# Patient Record
Sex: FEMALE | Race: WHITE | URBAN - METROPOLITAN AREA
[De-identification: names, ages, dates, MRNs, and addresses within clinical notes are randomized per-mention and may not be internally consistent; named-entity substitution may affect disease eponyms.]

---

## 2017-01-06 ENCOUNTER — TRANSFERRED RECORDS (OUTPATIENT)
Dept: HEALTH INFORMATION MANAGEMENT | Facility: CLINIC | Age: 27
End: 2017-01-06

## 2017-01-10 ENCOUNTER — PRENATAL OFFICE VISIT (OUTPATIENT)
Dept: OBGYN | Facility: CLINIC | Age: 27
End: 2017-01-10
Payer: MEDICAID

## 2017-01-10 VITALS
DIASTOLIC BLOOD PRESSURE: 78 MMHG | HEART RATE: 88 BPM | OXYGEN SATURATION: 97 % | TEMPERATURE: 97.9 F | BODY MASS INDEX: 22.37 KG/M2 | SYSTOLIC BLOOD PRESSURE: 115 MMHG | WEIGHT: 147.1 LBS

## 2017-01-10 DIAGNOSIS — G40.019 LOCALIZATION-RELATED (FOCAL) (PARTIAL) IDIOPATHIC EPILEPSY AND EPILEPTIC SYNDROMES WITH SEIZURES OF LOCALIZED ONSET, INTRACTABLE, WITHOUT STATUS EPILEPTICUS (H): Primary | ICD-10-CM

## 2017-01-10 DIAGNOSIS — Z34.00 ENCOUNTER FOR SUPERVISION OF NORMAL FIRST PREGNANCY, UNSPECIFIED TRIMESTER: ICD-10-CM

## 2017-01-10 LAB
GLUCOSE 1H P 50 G GLC PO SERPL-MCNC: 88 MG/DL (ref 60–129)
HGB BLD-MCNC: 10.9 G/DL (ref 11.7–15.7)

## 2017-01-10 PROCEDURE — 82950 GLUCOSE TEST: CPT | Performed by: OBSTETRICS & GYNECOLOGY

## 2017-01-10 PROCEDURE — 99212 OFFICE O/P EST SF 10 MIN: CPT | Performed by: OBSTETRICS & GYNECOLOGY

## 2017-01-10 PROCEDURE — 80339 ANTIEPILEPTICS NOS 1-3: CPT | Mod: 90 | Performed by: OBSTETRICS & GYNECOLOGY

## 2017-01-10 PROCEDURE — 80201 ASSAY OF TOPIRAMATE: CPT | Mod: 90 | Performed by: OBSTETRICS & GYNECOLOGY

## 2017-01-10 PROCEDURE — 99000 SPECIMEN HANDLING OFFICE-LAB: CPT | Performed by: OBSTETRICS & GYNECOLOGY

## 2017-01-10 PROCEDURE — 80175 DRUG SCREEN QUAN LAMOTRIGINE: CPT | Mod: 90 | Performed by: OBSTETRICS & GYNECOLOGY

## 2017-01-10 PROCEDURE — 00000218 ZZHCL STATISTIC OBHBG - HEMOGLOBIN: Performed by: OBSTETRICS & GYNECOLOGY

## 2017-01-10 PROCEDURE — 80299 QUANTITATIVE ASSAY DRUG: CPT | Mod: 90 | Performed by: OBSTETRICS & GYNECOLOGY

## 2017-01-10 PROCEDURE — 80177 DRUG SCRN QUAN LEVETIRACETAM: CPT | Mod: 90 | Performed by: OBSTETRICS & GYNECOLOGY

## 2017-01-10 PROCEDURE — 36415 COLL VENOUS BLD VENIPUNCTURE: CPT | Performed by: OBSTETRICS & GYNECOLOGY

## 2017-01-10 ASSESSMENT — ANXIETY QUESTIONNAIRES
6. BECOMING EASILY ANNOYED OR IRRITABLE: SEVERAL DAYS
7. FEELING AFRAID AS IF SOMETHING AWFUL MIGHT HAPPEN: NOT AT ALL
GAD7 TOTAL SCORE: 4
5. BEING SO RESTLESS THAT IT IS HARD TO SIT STILL: NOT AT ALL
1. FEELING NERVOUS, ANXIOUS, OR ON EDGE: SEVERAL DAYS
3. WORRYING TOO MUCH ABOUT DIFFERENT THINGS: NOT AT ALL
2. NOT BEING ABLE TO STOP OR CONTROL WORRYING: SEVERAL DAYS

## 2017-01-10 ASSESSMENT — PATIENT HEALTH QUESTIONNAIRE - PHQ9: 5. POOR APPETITE OR OVEREATING: SEVERAL DAYS

## 2017-01-10 NOTE — PROGRESS NOTES
Was in [facility] [place] for more seziures and had to resign job.  Has all new medications now to try and control seizures, but had one this morning.  Has growth u/s scheduled 1/23.  Mood good with PHQ-9 and ROSY 7 both <5 today--stable on zoloft.  Has baby showers coming up.  GCT and here with mother today. RTC 2-4 weeks. BE    Childbirth classes? Probably not  Plan on breastfeeding? Yes  Birthcontrol? oral contraceptive  Gender on ultrasound? boy  Circumsion? Yes--discussed outpatient  Peds doc? [facility] in [facility]

## 2017-01-11 ASSESSMENT — ANXIETY QUESTIONNAIRES: GAD7 TOTAL SCORE: 4

## 2017-01-11 ASSESSMENT — PATIENT HEALTH QUESTIONNAIRE - PHQ9: SUM OF ALL RESPONSES TO PHQ QUESTIONS 1-9: 3

## 2017-01-16 LAB
CLOBAZAM SERPL-MCNC: 345 UG/ML
DESMETHYLCLOBAZAM: 1157
DESMETHYLLACOSAMIDE: 2.9
LACOSAMIDE BLD-MCNC: 10 UG/ML
LAMOTRIGINE SERPL-MCNC: ABNORMAL UG/ML
LEVETIRACETAM SERPL-MCNC: 95.6 UG/ML
TOPIRAMATE SERPL-MCNC: 6.2 UG/ML

## 2017-01-18 ENCOUNTER — TRANSFERRED RECORDS (OUTPATIENT)
Dept: HEALTH INFORMATION MANAGEMENT | Facility: CLINIC | Age: 27
End: 2017-01-18

## 2017-01-23 ENCOUNTER — OFFICE VISIT (OUTPATIENT)
Dept: MATERNAL FETAL MEDICINE | Facility: CLINIC | Age: 27
End: 2017-01-23
Attending: OBSTETRICS & GYNECOLOGY
Payer: MEDICAID

## 2017-01-23 ENCOUNTER — HOSPITAL ENCOUNTER (OUTPATIENT)
Dept: ULTRASOUND IMAGING | Facility: CLINIC | Age: 27
Discharge: HOME | End: 2017-01-23
Attending: OBSTETRICS & GYNECOLOGY | Admitting: OBSTETRICS & GYNECOLOGY
Payer: MEDICAID

## 2017-01-23 DIAGNOSIS — G40.909 EPILEPSY AFFECTING PREGNANCY, ANTEPARTUM (H): ICD-10-CM

## 2017-01-23 DIAGNOSIS — O99.350 EPILEPSY AFFECTING PREGNANCY, ANTEPARTUM (H): ICD-10-CM

## 2017-01-23 DIAGNOSIS — O99.353 SEIZURE DISORDER DURING PREGNANCY IN THIRD TRIMESTER (H): Primary | ICD-10-CM

## 2017-01-23 DIAGNOSIS — G40.909 SEIZURE DISORDER DURING PREGNANCY IN THIRD TRIMESTER (H): Primary | ICD-10-CM

## 2017-01-23 PROCEDURE — 76816 OB US FOLLOW-UP PER FETUS: CPT

## 2017-01-23 NOTE — PROGRESS NOTES
"Please see \"Imaging\" tab under \"Chart Review\" for details of today's US.    Gisele Tovar, DO    "

## 2017-02-03 ENCOUNTER — MEDICAL CORRESPONDENCE (OUTPATIENT)
Dept: HEALTH INFORMATION MANAGEMENT | Facility: CLINIC | Age: 27
End: 2017-02-03

## 2017-02-03 ENCOUNTER — TRANSFERRED RECORDS (OUTPATIENT)
Dept: HEALTH INFORMATION MANAGEMENT | Facility: CLINIC | Age: 27
End: 2017-02-03

## 2017-02-09 ENCOUNTER — PRENATAL OFFICE VISIT (OUTPATIENT)
Dept: OBGYN | Facility: CLINIC | Age: 27
End: 2017-02-09
Payer: MEDICAID

## 2017-02-09 VITALS
HEART RATE: 87 BPM | WEIGHT: 152 LBS | DIASTOLIC BLOOD PRESSURE: 66 MMHG | BODY MASS INDEX: 23.12 KG/M2 | OXYGEN SATURATION: 99 % | SYSTOLIC BLOOD PRESSURE: 107 MMHG

## 2017-02-09 DIAGNOSIS — Z34.00 ENCOUNTER FOR SUPERVISION OF NORMAL FIRST PREGNANCY, UNSPECIFIED TRIMESTER: Primary | ICD-10-CM

## 2017-02-09 DIAGNOSIS — G40.019 LOCALIZATION-RELATED IDIOPATHIC EPILEPSY AND EPILEPTIC SYNDROMES WITH SEIZURES OF LOCALIZED ONSET, INTRACTABLE, WITHOUT STATUS EPILEPTICUS (H): Primary | ICD-10-CM

## 2017-02-09 PROCEDURE — 99212 OFFICE O/P EST SF 10 MIN: CPT | Performed by: OBSTETRICS & GYNECOLOGY

## 2017-02-09 PROCEDURE — 80339 ANTIEPILEPTICS NOS 1-3: CPT | Mod: 90 | Performed by: OBSTETRICS & GYNECOLOGY

## 2017-02-09 PROCEDURE — 36415 COLL VENOUS BLD VENIPUNCTURE: CPT | Performed by: OBSTETRICS & GYNECOLOGY

## 2017-02-09 PROCEDURE — 99000 SPECIMEN HANDLING OFFICE-LAB: CPT | Performed by: OBSTETRICS & GYNECOLOGY

## 2017-02-09 PROCEDURE — 80201 ASSAY OF TOPIRAMATE: CPT | Mod: 90 | Performed by: OBSTETRICS & GYNECOLOGY

## 2017-02-09 PROCEDURE — 80177 DRUG SCRN QUAN LEVETIRACETAM: CPT | Mod: 90 | Performed by: OBSTETRICS & GYNECOLOGY

## 2017-02-09 PROCEDURE — 80299 QUANTITATIVE ASSAY DRUG: CPT | Mod: 90 | Performed by: OBSTETRICS & GYNECOLOGY

## 2017-02-09 NOTE — PROGRESS NOTES
Seeing neuro every 2 weeks and has been changing meds and doses at least every 1-2 weeks. Has new med list with her today. Found out she will not be able to breastfeed--sad about that and so formula discussed. Has not had a seizure in 7 or 8 days now, but has bad auras. With insurance had to pick either me or neuro to be on insurance since [facility] vs [facility] and she chose neuro. May need to transfer to [facility] for delivery and working on that now with insurance, hopes can stay with us until May 1st. Will let me know.  Neuro discussed maybe doing IOL so can deliver during the day and not be over tired as more likely to have sz then--discussed will depend on her cervical exam at term.   Is going to move in with parents as has to move out of apartment now. Last growth 68%, has repeat growth scheduled. Has had some contractions, mostly at night. Discussed contractions vs uterine irritability. Sounds more like irritable uterus. Drinking lots of liquids. Check labs today for neuro. Has next growth u/s 2/21. RTC 2 weeks. BE

## 2017-02-11 ENCOUNTER — HOSPITAL ENCOUNTER (OUTPATIENT)
Facility: CLINIC | Age: 27
Setting detail: OBSERVATION
Discharge: HOME | End: 2017-02-13
Attending: OBSTETRICS & GYNECOLOGY | Admitting: OBSTETRICS & GYNECOLOGY
Payer: MEDICAID

## 2017-02-11 ENCOUNTER — MYC MEDICAL ADVICE (OUTPATIENT)
Dept: OBGYN | Facility: CLINIC | Age: 27
End: 2017-02-11

## 2017-02-11 DIAGNOSIS — Z33.1 PREGNANCY, INCIDENTAL: Primary | ICD-10-CM

## 2017-02-11 PROBLEM — Z36.89 ENCOUNTER FOR TRIAGE IN PREGNANT PATIENT: Status: ACTIVE | Noted: 2017-02-11

## 2017-02-11 LAB
ALBUMIN UR-MCNC: NEGATIVE MG/DL
APPEARANCE UR: CLEAR
BACTERIA #/AREA URNS HPF: ABNORMAL /HPF
BILIRUB UR QL STRIP: NEGATIVE
COLOR UR AUTO: ABNORMAL
DESMETHYLLACOSAMIDE: 2.9
GLUCOSE UR STRIP-MCNC: NEGATIVE MG/DL
HGB UR QL STRIP: ABNORMAL
KETONES UR STRIP-MCNC: NEGATIVE MG/DL
LACOSAMIDE BLD-MCNC: 13.2 UG/ML
LEUKOCYTE ESTERASE UR QL STRIP: NEGATIVE
LEVETIRACETAM SERPL-MCNC: 90.7 UG/ML
MICRO REPORT STATUS: ABNORMAL
NITRATE UR QL: NEGATIVE
PH UR STRIP: 7 PH (ref 5–7)
RBC #/AREA URNS AUTO: 13 /HPF (ref 0–2)
SP GR UR STRIP: 1 (ref 1–1.03)
SPECIMEN SOURCE: ABNORMAL
SQUAMOUS #/AREA URNS AUTO: <1 /HPF (ref 0–1)
TOPIRAMATE SERPL-MCNC: 3.7 UG/ML
URN SPEC COLLECT METH UR: ABNORMAL
UROBILINOGEN UR STRIP-MCNC: NORMAL MG/DL (ref 0–2)
WBC #/AREA URNS AUTO: 1 /HPF (ref 0–2)
WET PREP SPEC: ABNORMAL

## 2017-02-11 PROCEDURE — 99214 OFFICE O/P EST MOD 30 MIN: CPT | Mod: 25

## 2017-02-11 PROCEDURE — 81001 URINALYSIS AUTO W/SCOPE: CPT | Performed by: OBSTETRICS & GYNECOLOGY

## 2017-02-11 PROCEDURE — 25900015 H RX 259: Performed by: OBSTETRICS & GYNECOLOGY

## 2017-02-11 PROCEDURE — 87210 SMEAR WET MOUNT SALINE/INK: CPT | Performed by: OBSTETRICS & GYNECOLOGY

## 2017-02-11 PROCEDURE — 59025 FETAL NON-STRESS TEST: CPT | Mod: 26 | Performed by: OBSTETRICS & GYNECOLOGY

## 2017-02-11 PROCEDURE — 87491 CHLMYD TRACH DNA AMP PROBE: CPT | Performed by: OBSTETRICS & GYNECOLOGY

## 2017-02-11 PROCEDURE — 59025 FETAL NON-STRESS TEST: CPT

## 2017-02-11 PROCEDURE — 96360 HYDRATION IV INFUSION INIT: CPT

## 2017-02-11 PROCEDURE — 87591 N.GONORRHOEAE DNA AMP PROB: CPT | Performed by: OBSTETRICS & GYNECOLOGY

## 2017-02-11 PROCEDURE — 25800025 ZZH RX 258

## 2017-02-11 PROCEDURE — 25000132 ZZH RX MED GY IP 250 OP 250 PS 637: Performed by: OBSTETRICS & GYNECOLOGY

## 2017-02-11 PROCEDURE — 99214 OFFICE O/P EST MOD 30 MIN: CPT | Mod: 25 | Performed by: OBSTETRICS & GYNECOLOGY

## 2017-02-11 RX ORDER — CLOBAZAM 10 MG/1
20 TABLET ORAL DAILY
Status: DISCONTINUED | COMMUNITY
Start: 2017-02-12 | End: 2017-02-13 | Stop reason: HOSPADM

## 2017-02-11 RX ORDER — SODIUM CHLORIDE, SODIUM LACTATE, POTASSIUM CHLORIDE, CALCIUM CHLORIDE 600; 310; 30; 20 MG/100ML; MG/100ML; MG/100ML; MG/100ML
INJECTION, SOLUTION INTRAVENOUS
Status: COMPLETED
Start: 2017-02-11 | End: 2017-02-11

## 2017-02-11 RX ORDER — NIFEDIPINE 20 MG/1
20 CAPSULE ORAL ONCE
Status: COMPLETED | OUTPATIENT
Start: 2017-02-11 | End: 2017-02-11

## 2017-02-11 RX ORDER — LACOSAMIDE 50 MG/1
100 TABLET ORAL DAILY
Status: DISCONTINUED | OUTPATIENT
Start: 2017-02-12 | End: 2017-02-13 | Stop reason: HOSPADM

## 2017-02-11 RX ORDER — LACOSAMIDE 200 MG/1
400 TABLET ORAL DAILY
Status: DISCONTINUED | OUTPATIENT
Start: 2017-02-12 | End: 2017-02-13 | Stop reason: HOSPADM

## 2017-02-11 RX ORDER — TOPIRAMATE 25 MG/1
25 TABLET, FILM COATED ORAL 4 TIMES DAILY
Status: DISCONTINUED | OUTPATIENT
Start: 2017-02-12 | End: 2017-02-13 | Stop reason: HOSPADM

## 2017-02-11 RX ORDER — NIFEDIPINE 20 MG/1
20 CAPSULE ORAL EVERY 30 MIN PRN
Status: COMPLETED | OUTPATIENT
Start: 2017-02-11 | End: 2017-02-11

## 2017-02-11 RX ORDER — LACOSAMIDE 200 MG/1
200 TABLET ORAL 2 TIMES DAILY
Status: DISCONTINUED | OUTPATIENT
Start: 2017-02-12 | End: 2017-02-13 | Stop reason: HOSPADM

## 2017-02-11 RX ORDER — HYDROXYZINE HYDROCHLORIDE 50 MG/1
50 TABLET, FILM COATED ORAL DAILY
Status: DISCONTINUED | OUTPATIENT
Start: 2017-02-12 | End: 2017-02-13 | Stop reason: HOSPADM

## 2017-02-11 RX ORDER — CLOBAZAM 10 MG/1
10 TABLET ORAL 3 TIMES DAILY
Status: DISCONTINUED | OUTPATIENT
Start: 2017-02-12 | End: 2017-02-13 | Stop reason: HOSPADM

## 2017-02-11 RX ADMIN — LACOSAMIDE 100 MG: 50 TABLET, FILM COATED ORAL at 23:58

## 2017-02-11 RX ADMIN — NIFEDIPINE 20 MG: 20 CAPSULE, LIQUID FILLED ORAL at 23:29

## 2017-02-11 RX ADMIN — SODIUM CHLORIDE, POTASSIUM CHLORIDE, SODIUM LACTATE AND CALCIUM CHLORIDE 1000 ML: 600; 310; 30; 20 INJECTION, SOLUTION INTRAVENOUS at 20:48

## 2017-02-11 RX ADMIN — NIFEDIPINE 20 MG: 20 CAPSULE, LIQUID FILLED ORAL at 21:49

## 2017-02-11 RX ADMIN — LEVETIRACETAM 2000 MG: 750 TABLET, FILM COATED ORAL at 23:58

## 2017-02-11 RX ADMIN — NIFEDIPINE 20 MG: 20 CAPSULE, LIQUID FILLED ORAL at 22:47

## 2017-02-11 RX ADMIN — CLOBAZAM 20 MG: 20 TABLET ORAL at 23:58

## 2017-02-11 RX ADMIN — HYDROXYZINE HYDROCHLORIDE 50 MG: 50 TABLET, FILM COATED ORAL at 23:58

## 2017-02-11 RX ADMIN — TOPIRAMATE 25 MG: 25 TABLET, FILM COATED ORAL at 23:58

## 2017-02-11 RX ADMIN — LACOSAMIDE 400 MG: 200 TABLET, FILM COATED ORAL at 23:58

## 2017-02-11 NOTE — IP AVS SNAPSHOT
[facility]    [address]    [place] [address]    Phone:  [phone]                                       After Visit Summary   2/11/2017    Gisele Jose    MRN: [id number]           After Visit Summary Signature Page     I have received my discharge instructions, and my questions have been answered. I have discussed any challenges I see with this plan with the nurse or doctor.    ..........................................................................................................................................  Patient/Patient Representative Signature      ..........................................................................................................................................  Patient Representative Print Name and Relationship to Patient    ..................................................               ................................................  Date                                            Time    ..........................................................................................................................................  Reviewed by Signature/Title    ...................................................              ..............................................  Date                                                            Time

## 2017-02-12 PROBLEM — O60.00 PRETERM LABOR: Status: ACTIVE | Noted: 2017-02-12

## 2017-02-12 LAB
C TRACH DNA SPEC QL NAA+PROBE: NORMAL
CLOBAZAM SERPL-MCNC: 478 UG/ML
DESMETHYLCLOBAZAM: 1377
N GONORRHOEA DNA SPEC QL NAA+PROBE: NORMAL
SPECIMEN SOURCE: NORMAL
SPECIMEN SOURCE: NORMAL

## 2017-02-12 PROCEDURE — 25000132 ZZH RX MED GY IP 250 OP 250 PS 637: Performed by: OBSTETRICS & GYNECOLOGY

## 2017-02-12 PROCEDURE — G0378 HOSPITAL OBSERVATION PER HR: HCPCS

## 2017-02-12 PROCEDURE — 99224 ZZC SUBSEQUENT OBSERVATION CARE,LEVEL I: CPT | Performed by: OBSTETRICS & GYNECOLOGY

## 2017-02-12 PROCEDURE — 96372 THER/PROPH/DIAG INJ SC/IM: CPT

## 2017-02-12 PROCEDURE — 25000125 ZZHC RX 250: Performed by: OBSTETRICS & GYNECOLOGY

## 2017-02-12 RX ORDER — LORAZEPAM 2 MG/ML
0.5 CONCENTRATE ORAL
Status: DISCONTINUED | OUTPATIENT
Start: 2017-02-12 | End: 2017-02-13 | Stop reason: HOSPADM

## 2017-02-12 RX ORDER — BETAMETHASONE SODIUM PHOSPHATE AND BETAMETHASONE ACETATE 3; 3 MG/ML; MG/ML
12 INJECTION, SUSPENSION INTRA-ARTICULAR; INTRALESIONAL; INTRAMUSCULAR; SOFT TISSUE EVERY 24 HOURS
Status: COMPLETED | OUTPATIENT
Start: 2017-02-12 | End: 2017-02-12

## 2017-02-12 RX ORDER — PRENATAL VIT/IRON FUM/FOLIC AC 27MG-0.8MG
1 TABLET ORAL DAILY
Status: DISCONTINUED | OUTPATIENT
Start: 2017-02-12 | End: 2017-02-13 | Stop reason: HOSPADM

## 2017-02-12 RX ORDER — NIFEDIPINE 20 MG/1
20 CAPSULE ORAL EVERY 6 HOURS
Status: COMPLETED | OUTPATIENT
Start: 2017-02-12 | End: 2017-02-12

## 2017-02-12 RX ORDER — FERROUS SULFATE 325(65) MG
325 TABLET ORAL DAILY
Status: DISCONTINUED | OUTPATIENT
Start: 2017-02-12 | End: 2017-02-13 | Stop reason: HOSPADM

## 2017-02-12 RX ORDER — ACETAMINOPHEN 325 MG/1
650 TABLET ORAL EVERY 4 HOURS PRN
Status: DISCONTINUED | OUTPATIENT
Start: 2017-02-12 | End: 2017-02-13 | Stop reason: HOSPADM

## 2017-02-12 RX ADMIN — NIFEDIPINE 20 MG: 20 CAPSULE, LIQUID FILLED ORAL at 05:56

## 2017-02-12 RX ADMIN — LACOSAMIDE 200 MG: 200 TABLET, FILM COATED ORAL at 05:56

## 2017-02-12 RX ADMIN — TOPIRAMATE 25 MG: 25 TABLET, FILM COATED ORAL at 23:52

## 2017-02-12 RX ADMIN — LACOSAMIDE 200 MG: 200 TABLET, FILM COATED ORAL at 11:58

## 2017-02-12 RX ADMIN — TOPIRAMATE 25 MG: 25 TABLET, FILM COATED ORAL at 05:58

## 2017-02-12 RX ADMIN — NIFEDIPINE 20 MG: 20 CAPSULE, LIQUID FILLED ORAL at 11:58

## 2017-02-12 RX ADMIN — ACETAMINOPHEN 650 MG: 325 TABLET ORAL at 06:32

## 2017-02-12 RX ADMIN — CLOBAZAM 10 MG: 10 TABLET ORAL at 17:58

## 2017-02-12 RX ADMIN — HYDROXYZINE HYDROCHLORIDE 50 MG: 50 TABLET, FILM COATED ORAL at 23:52

## 2017-02-12 RX ADMIN — LACOSAMIDE 400 MG: 200 TABLET, FILM COATED ORAL at 23:53

## 2017-02-12 RX ADMIN — CLOBAZAM 10 MG: 10 TABLET ORAL at 05:56

## 2017-02-12 RX ADMIN — TOPIRAMATE 25 MG: 25 TABLET, FILM COATED ORAL at 17:57

## 2017-02-12 RX ADMIN — LACOSAMIDE 100 MG: 50 TABLET, FILM COATED ORAL at 17:57

## 2017-02-12 RX ADMIN — SERTRALINE HYDROCHLORIDE 100 MG: 50 TABLET, FILM COATED ORAL at 11:57

## 2017-02-12 RX ADMIN — CLOBAZAM 10 MG: 10 TABLET ORAL at 11:59

## 2017-02-12 RX ADMIN — BETAMETHASONE SODIUM PHOSPHATE AND BETAMETHASONE ACETATE 12 MG: 3; 3 INJECTION, SUSPENSION INTRA-ARTICULAR; INTRALESIONAL; INTRAMUSCULAR at 00:19

## 2017-02-12 RX ADMIN — NIFEDIPINE 20 MG: 20 CAPSULE, LIQUID FILLED ORAL at 18:01

## 2017-02-12 RX ADMIN — TOPIRAMATE 25 MG: 25 TABLET, FILM COATED ORAL at 11:57

## 2017-02-12 RX ADMIN — LEVETIRACETAM 2000 MG: 750 TABLET, FILM COATED ORAL at 05:57

## 2017-02-12 RX ADMIN — BETAMETHASONE SODIUM PHOSPHATE AND BETAMETHASONE ACETATE 12 MG: 3; 3 INJECTION, SUSPENSION INTRA-ARTICULAR; INTRALESIONAL; INTRAMUSCULAR at 23:53

## 2017-02-12 RX ADMIN — LEVETIRACETAM 2000 MG: 750 TABLET, FILM COATED ORAL at 17:57

## 2017-02-12 RX ADMIN — CLOBAZAM 20 MG: 20 TABLET ORAL at 23:53

## 2017-02-12 RX ADMIN — PRENATAL VIT W/ FE FUMARATE-FA TAB 27-0.8 MG 1 TABLET: 27-0.8 TAB at 12:19

## 2017-02-12 RX ADMIN — IRON 325 MG: 65 TABLET ORAL at 12:19

## 2017-02-12 RX ADMIN — LEVETIRACETAM 2000 MG: 750 TABLET, FILM COATED ORAL at 11:55

## 2017-02-12 RX ADMIN — LEVETIRACETAM 2000 MG: 750 TABLET, FILM COATED ORAL at 23:52

## 2017-02-12 NOTE — PLAN OF CARE
Data: Patient presented to [facility] at 1938.   Reason for maternal/fetal assessment per patient is  Labor  .  Patient is a . Prenatal record reviewed.      Obstetric History       T0      TAB0   SAB0   E0   M0   L0       # Outcome Date GA Lbr Marshall/2nd Weight Sex Delivery Anes PTL Lv   1 Current               . Medical history:   Past Medical History   Diagnosis Date     Seizure disorder, partial dx age 6     Epilepsy, mult triggers.  [facility] every 6 mos     Asthma, intermittent      Allergic rhinitis      Anxiety      triggers seizure     Depression      Insomnia      triggers seizure     Migraine      Papanicolaou smear of cervix with low grade squamous intraepithelial lesion (LGSIL) 08     BRIANNA I     ASCUS on Pap smear 2014     age 23     ASCUS with positive high risk HPV cervical 16     ASCUS pap, + HR HPV (not 16 or 18). Pt is currently pregnant.      Anemia    . Gestational Age 31w3d. VSS. Fetal movement present. Patient deniesUTI symptoms, GI problems, bloody show, vaginal bleeding, edema, headache, visual disturbances, epigastric or URQ pain, abdominal pain, rupture of membranes. Reports  cramping, backache, vaginal discharge, pelvic pressure. Support person present.  Action: Verbal consent for EFM. Triage assessment completed. EFM applied. Uterine assessment via toco. Fetal assessment: Presumed adequate fetal oxygenation documented (see flow record).   Response: Dr. Copeland and Dr. Powell informed of patient arrival. Plan per provider is IV Bolus for contractions, plus nifedipine, transfer for antepartum unit for observation. Patient verbalized agreement with plan. Patient transferred to room 424 ambulatory, oriented to room and call light.

## 2017-02-12 NOTE — PLAN OF CARE
Data: Contraction pattern decreased from night shift. About 7 ctx per hour. Dr. Brown aware and not concerned at this time.. Fetal assessment Reactive.  Interventions: Continue uterine/fetal assessment every shift as ordered. Activity level:Regular activity  Unrestricted activity, and preventive measures including Positioning and Frequent voiding. Encourage active range of motion and frequent position changes.  Plan: Continue expectant management. Observe for and notify care provider of indications of progressing labor or signs of fetal/maternal compromise.

## 2017-02-12 NOTE — PROVIDER NOTIFICATION
02/12/17 0040   Provider Notification   Provider Name/Title Copeland   Method of Notification Phone   Notification Reason Other (Comment)     Spoke with provider about new monitoring orders as well as seizure pads. Provider aware that patient declines seizure pads at this time, and patient aware that they are on unit if she changes her mind.

## 2017-02-12 NOTE — PLAN OF CARE
Patient slept until about 0430, then up. Feeling the same pressure and period-like cramps. EFM as charted; VSS. No auras or seizures through the night. Continue expectant management.

## 2017-02-12 NOTE — PROGRESS NOTES
Noted on monitor this morning to be having frequent contractions q1-3.  Patient reporting pelvic pressure with some contractions.  Cervix rechecked.  External os 1cm, internal os closed.  Remains stable from prior check by Dr. Powell yesterday.  Dr. Powell notified of exam; signout given to oncoming day team.    Aline Copeland MD  OB/GYN Resident, PGY3  02/12/17 7:30 AM

## 2017-02-12 NOTE — PROVIDER NOTIFICATION
02/12/17 0638   Provider Notification   Provider Name/Title G3   Method of Notification Electronic Page   Notification Reason Uterine Activity     Provider notified of contractions every 1-3 minutes. Patient not feeling all of them, but feels pelvic pressure with some.

## 2017-02-12 NOTE — H&P
[facility] Labor and Delivery History and Physical    Gisele Jose MRN# [id number]   Age: 26 year old YOB: 1990     Date of Admission:  2017    Primary care provider: Marci Boothe         CC:     labor         HPI:   Gisele Jose is a 26 year old  at 31w3d by LMP presenting with contractions, back pain, and pelvic pressure.  Also notes feeling of difficulty urinating and feeling like she is unable to empty her bladder.  Overall these symptoms have been worsening over the course of the day.  Reports good fetal movement.  No loss of fluid or vaginal bleeding.      Pregnancy complicated by:  -Epilepsy with frequent seizures, followed by Neurology  -Anxiety         Pregnancy history:     OBSTETRIC HISTORY:    Obstetric History       T0      TAB0   SAB0   E0   M0   L0       # Outcome Date GA Lbr Marshall/2nd Weight Sex Delivery Anes PTL Lv   1 Current                   Prenatal Labs:   Lab Results   Component Value Date    ABO O 2016    RH  Pos 2016    AS Neg 2016    HEPBANG Nonreactive 2016    CHPCRT  2015     Negative   Negative for C. trachomatis rRNA by transcription mediated amplification.   A negative result by transcription mediated amplification does not preclude the   presence of C. trachomatis infection because results are dependent on proper   and adequate collection, absence of inhibitors, and sufficient rRNA to be   detected.      GCPCRT  2015     Negative   Negative for N. gonorrhoeae rRNA by transcription mediated amplification.   A negative result by transcription mediated amplification does not preclude the   presence of N. gonorrhoeae infection because results are dependent on proper   and adequate collection, absence of inhibitors, and sufficient rRNA to be   detected.      TREPAB Negative 2016    HGB 10.9* 01/10/2017    HIV Negative 2011       GBS Status:    No results found for: GBS    Active Problem List  Patient Active Problem List   Diagnosis     Seizure disorder (H)     Papanicolaou smear of cervix with low grade squamous intraepithelial lesion (LGSIL)     Insomnia     CARDIOVASCULAR SCREENING; LDL GOAL LESS THAN 160     Anxiety     Seasonal allergies     Intractable epilepsy (H)     Mild persistent asthma     Migraine     Intentional drug overdose (H)     Intentional benzodiazepine overdose (H)     Migraine headache     Intermittent asthma     Asthma     Mixed anxiety depressive disorder     Eating disorder     Epilepsy (H)     Facial injury     Cannabis abuse     Need for Tdap vaccination     Bowel disease, inflammatory     Need for measles-mumps-rubella (MMR) vaccine     Encounter for supervision of normal first pregnancy, unspecified trimester     Seizure disorder during pregnancy second trimester, antepartum (H)     Encounter for triage in pregnant patient       Medication Prior to Admission  Prescriptions prior to admission   Medication Sig Dispense Refill Last Dose     HydrOXYzine Pamoate (VISTARIL PO)    2/10/2017 at Unknown time     LevETIRAcetam (KEPPRA PO) Take 1,000 mg by mouth   2/11/2017 at Unknown time     Topiramate (TOPAMAX PO) Take 25 mg by mouth   2/11/2017 at Unknown time     clobazam (ONFI) tablet Take 10 mg by mouth daily   2/11/2017 at Unknown time     Lacosamide (VIMPAT PO) Take 200 mg by mouth   2/11/2017 at Unknown time     sertraline (ZOLOFT) 100 MG tablet Take 100 mg by mouth daily   2/11/2017 at Unknown time     Prenatal Vit-Fe Fumarate-FA (PRENATAL MULTIVITAMIN  PLUS IRON) 27-0.8 MG TABS Take 1 tablet by mouth daily   2/11/2017 at Unknown time     Acetaminophen (TYLENOL PO) Take 1,000 mg by mouth every 6 hours as needed for mild pain or fever   Unknown at Unknown time     OXYCODONE HCL PO Take 10 mg by mouth every 6 hours as needed   More than a month at Unknown time     Acetaminophen (TYLENOL PO) Take 325 mg by mouth as needed for  mild pain or fever   Unknown at Unknown time   .        Maternal Past Medical History:     Past Medical History   Diagnosis Date     Seizure disorder, partial dx age 6     Epilepsy, mult triggers.  [facility] every 6 mos     Asthma, intermittent      Allergic rhinitis      Anxiety      triggers seizure     Depression      Insomnia      triggers seizure     Migraine      Papanicolaou smear of cervix with low grade squamous intraepithelial lesion (LGSIL) 9/24/08     BRIANNA I     ASCUS on Pap smear 1/2014     age 23     ASCUS with positive high risk HPV cervical 09/14/16     ASCUS pap, + HR HPV (not 16 or 18). Pt is currently pregnant.      Anemia                        Family History:     Family History   Problem Relation Age of Onset     Lipids Mother      CEREBROVASCULAR DISEASE Paternal Grandfather      CEREBROVASCULAR DISEASE Maternal Grandfather      HEART DISEASE Maternal Grandfather      Lipids Maternal Grandmother      GASTROINTESTINAL DISEASE Paternal Grandfather      liver disease     GASTROINTESTINAL DISEASE Paternal Grandmother      unknown     Thyroid Disease Paternal Aunt      Genitourinary Problems Paternal Grandmother      Depression Brother      Asthma Mother      Other Cancer Paternal Grandfather      throat     KIDNEY DISEASE Paternal Grandmother             Social History:     Social History     Social History     Marital Status: Single     Spouse Name: Teto     Number of Children: 0     Years of Education: N/A     Occupational History           Social History Main Topics     Smoking status: Former Smoker -- 0.50 packs/day     Types: Cigarettes     Quit date: 01/01/2011     Smokeless tobacco: Never Used     Alcohol Use: No     Drug Use: No     Sexual Activity:     Partners: Male     Birth Control/ Protection:      Other Topics Concern     Parent/Sibling W/ Cabg, Mi Or Angioplasty Before 65f 55m? No     Exercise Yes     4/5 times a week     Social History Narrative    Caffeine intake/servings  daily - 1    Calcium intake/servings daily - 3    Exercise 3 times weekly - describe ; walks    Sunscreen used - Yes    Seatbelts used - Yes    Guns stored in the home - No    Self Breast Exam - Yes    Pap test up to date -  Yes    Eye exam up to date -  Yes    Dental exam up to date -  Yes    DEXA scan up to date -  No    Flex Sig/Colonoscopy up to date -  No    Mammography up to date -  No    Immunizations reviewed and up to date - No    Abuse: Current or Past (Physical, Sexual or Emotional) - No    Do you feel safe in your environment - Yes    Do you cope well with stress - Yes    Do you suffer from insomnia - No    Last updated by: Pamella Shabazz  2016                        Review of Systems:   Negative except as noted above in the HPI         Physical Exam:     Filed Vitals:    17 2150 17 2247 17 2306 17 2329   BP: 97/65 114/57 116/68 121/66   Temp:   98  F (36.7  C) 98.7  F (37.1  C)   TempSrc:    Axillary   Resp: 18 18       Gen: resting comfortably, NAD  Cardio: regular rate  Resp: breathing comfortably  Abdomen: gravid, soft, nontender  Cervix: external os closed, changed to external os 1cm w/internal os closed.  Long/posterior/high    Fetal Heart Rate Tracin, moderate, +accels, no decels  Tocometer: andres q2-3 min with baseline irritability                      Assessment:   Gisele Jose is a 26 year old  at 31w3d by LMP  admitted for cervical change concerning for  labor.        Plan:   Admit to Antepartum  Betamethasone 12mg q24h x2 doses  Nifedipine through betamethasone window    Seizure disorder  -continue home meds (clobazam, lacosamide, keppra, topamax)  -seizure pads ordered; patient declined having on bed    Fetal Wellbeing  TID monitoring    Patient staffed with Dr. Andre Copeland MD  OB/GYN PGY3  2017 11:58 PM    Attestation:   This patient was seen and evaluated by me, separately from the house staff  team. I have reviewed the note/plan above and agree.     NST reactive and did have cervical change with contractions. Will do betamethasone x2 and plan nifedipine now to stop contractions. Would plan for mag for neuroprotection ONLY if really going into labor and would need to call neuro.    Anticipate discharge in <48 hours.    Julisa Powell MD

## 2017-02-12 NOTE — DOWNTIME EVENT NOTE
The EMR was down for 4.5 hours on 2/12/2017.    Janna Cazares was responsible for completing the paper charting during this time period.     The following information was re-entered into the system by JANNA CAZARES: none    The following information will remain in the paper chart: flowsheet data.    JANNA CAZARES  2/12/2017

## 2017-02-12 NOTE — PROVIDER NOTIFICATION
02/12/17 1000   Provider Notification   Provider Name/Title Dr. Brown   Method of Notification At Bedside   Request Evaluate in Person   Notification Reason Status Update   Discussing plan of care, continue Nifedipine for sure until next dose of Beta. Apply fetal monitor for reported cramping currently. Stay hydrated, consider IVF.

## 2017-02-12 NOTE — PROGRESS NOTES
ADRYAN MD ANTEPARTUM PROGRESS NOTE:   February 12, 2017   11:14 AM          SUBJECTIVE:   Patient c/o mild headache.  Drinking lots of water.  Has lower abdominal pain increased with movement, and uterus gets tight.  Good appetite, feels well.  No seizures.   No loss of fluid, had a trace of pink mucus after vaginal exam otherwise no bleeding              OBJECTIVE:   /60  Temp 98.7  F (37.1  C) (Axillary)  Resp 18  LMP 07/06/2016 (Exact Date)    NST:  Fetal Heart Rate Tracing:   Category 1    Tocometer: Irregular and Uterine irritability      Lungs:   Non-labored, no cough or wheeze    Cardiovascular:   normal S1 and S2 and no edema   Abdomen:  Soft, uterus feels tight/irritable  Gravid appropriate for gestational age  Cephalic lie   Musculoskeletal:   no lower extremity pitting edema present   Spec/SVE:  Deferred as recently performed         Recent Results (from the past 24 hour(s))   UA with Microscopic reflex to Culture    Collection Time: 02/11/17  8:00 PM   Result Value Ref Range    Color Urine Straw     Appearance Urine Clear     Glucose Urine Negative NEG mg/dL    Bilirubin Urine Negative NEG    Ketones Urine Negative NEG mg/dL    Specific Gravity Urine 1.002 (L) 1.003 - 1.035    Blood Urine Moderate (A) NEG    pH Urine 7.0 5.0 - 7.0 pH    Protein Albumin Urine Negative NEG mg/dL    Urobilinogen mg/dL Normal 0.0 - 2.0 mg/dL    Nitrite Urine Negative NEG    Leukocyte Esterase Urine Negative NEG    Source Clean catch urine     WBC Urine 1 0 - 2 /HPF    RBC Urine 13 (H) 0 - 2 /HPF    Bacteria Urine Few (A) NEG /HPF    Squamous Epithelial /HPF Urine <1 0 - 1 /HPF   Wet prep    Collection Time: 02/11/17  8:30 PM   Result Value Ref Range    Specimen Description Vagina     Wet Prep (A)      Few PMNs seen  Few Clue cells seen  No Trichomonas seen  No yeast seen      Micro Report Status FINAL 02/11/2017                 IMAGING:   No imaging studies have been ordered             ASSESSMENT:    1. 26 year old   31w4d ; r/o PTL, second dose of betamethasone due 11 pm.  On scheduled nifedipine until then  Tolerating nifedipine OK  MFM endorses magnesium sulfate as safe in setting of her seizure disorder             PLAN:    1.  Will stop nifedipine after betamethasone is given this evening  Continue to monitor for cervical change   2. Evaluate for discharge tomorrow a.m. (if no cervical change)    Rozina Brown MD

## 2017-02-12 NOTE — PROGRESS NOTES
Pt continues to contract. Repeat cervical exam internal os closed, external os 1 cm.  Still long, posterior and high.  Bloody show.     labor--contractions with cervical change    Will do betamethasone and nifedipine q6 hours.  Will hold off on magnesium due to her multiple seizure medications.  NST reactive    Julisa Powell MD

## 2017-02-13 VITALS
RESPIRATION RATE: 18 BRPM | DIASTOLIC BLOOD PRESSURE: 68 MMHG | HEART RATE: 71 BPM | TEMPERATURE: 98 F | SYSTOLIC BLOOD PRESSURE: 111 MMHG

## 2017-02-13 PROCEDURE — 25000132 ZZH RX MED GY IP 250 OP 250 PS 637: Performed by: OBSTETRICS & GYNECOLOGY

## 2017-02-13 PROCEDURE — 96360 HYDRATION IV INFUSION INIT: CPT

## 2017-02-13 PROCEDURE — 99217 ZZC OBSERVATION CARE DISCHARGE: CPT | Mod: 25 | Performed by: OBSTETRICS & GYNECOLOGY

## 2017-02-13 PROCEDURE — 25800025 ZZH RX 258: Performed by: OBSTETRICS & GYNECOLOGY

## 2017-02-13 PROCEDURE — 59025 FETAL NON-STRESS TEST: CPT | Mod: 26 | Performed by: OBSTETRICS & GYNECOLOGY

## 2017-02-13 PROCEDURE — G0378 HOSPITAL OBSERVATION PER HR: HCPCS

## 2017-02-13 RX ORDER — ACETAMINOPHEN 325 MG/1
650 TABLET ORAL EVERY 6 HOURS PRN
Qty: 100 TABLET | Refills: 0 | Status: SHIPPED | OUTPATIENT
Start: 2017-02-13

## 2017-02-13 RX ADMIN — LACOSAMIDE 200 MG: 200 TABLET, FILM COATED ORAL at 12:02

## 2017-02-13 RX ADMIN — LEVETIRACETAM 2000 MG: 750 TABLET, FILM COATED ORAL at 05:56

## 2017-02-13 RX ADMIN — LEVETIRACETAM 2000 MG: 750 TABLET, FILM COATED ORAL at 12:01

## 2017-02-13 RX ADMIN — IRON 325 MG: 65 TABLET ORAL at 09:36

## 2017-02-13 RX ADMIN — LACOSAMIDE 200 MG: 200 TABLET, FILM COATED ORAL at 05:56

## 2017-02-13 RX ADMIN — PRENATAL VIT W/ FE FUMARATE-FA TAB 27-0.8 MG 1 TABLET: 27-0.8 TAB at 09:36

## 2017-02-13 RX ADMIN — CLOBAZAM 10 MG: 10 TABLET ORAL at 05:56

## 2017-02-13 RX ADMIN — TOPIRAMATE 25 MG: 25 TABLET, FILM COATED ORAL at 05:56

## 2017-02-13 RX ADMIN — SERTRALINE HYDROCHLORIDE 100 MG: 50 TABLET, FILM COATED ORAL at 12:03

## 2017-02-13 RX ADMIN — TOPIRAMATE 25 MG: 25 TABLET, FILM COATED ORAL at 12:03

## 2017-02-13 RX ADMIN — CLOBAZAM 10 MG: 10 TABLET ORAL at 12:00

## 2017-02-13 RX ADMIN — SODIUM CHLORIDE, POTASSIUM CHLORIDE, SODIUM LACTATE AND CALCIUM CHLORIDE 500 ML: 600; 310; 30; 20 INJECTION, SOLUTION INTRAVENOUS at 11:20

## 2017-02-13 NOTE — PROGRESS NOTES
Antepartum Progress Note    Subjective:  She is resting comfortably in bed this morning. Feels as though her contractions have spaced out and are irregular. Continues to note some dark brown blood on pad, but otherwise denies leakage of fluid or bright red blood. Confirms fetal movement. No additional complaints.  Does inquire about transferring her OBGYN care for insurance reasons (Neurologist in different hospital system).    Objective:  Vitals:    17 1616 17 1756 17 2350 17 0600   BP: 108/69 119/75  111/68   Pulse:       Resp:   18    Temp: 98.2  F (36.8  C)  98  F (36.7  C)    TempSrc: Oral  Oral        General: NAD. A&Ox3.  CV: RRR.  Pulm: CTAB. Normal respiratory effort.  Abd: Gravid, soft, non-tender, non-distended.  Ext: trace edema. No calf tenderness.    SVE: Closed internal os/Long/High    FHT:  Baseline 120s, moderate variability, accelerations present  [place] [employer]: <1 contraction per 10 minutes.    Assessment/Plan:  Gisele Jose is a 26 year old  female at 31w5d by LMP who was admitted for extended monitoring d/t concern for  labor    - Threatened  labor   - S/p betamethasone and nifedipine.   - No further cervical change noted.   - Contractions no longer regular   - Stable to discharge home with return for prenatal appointment in 1-2 weeks.    - Seizure disorder   - Continue medications: clobazam, lacosamide, keppra, and topamax   - Next apppointmen this Friday.    - Fetal well being   - Category I. Reactive and reassuring.    - Dispo: discharge to home today.    Saadia Falk MD  OB/GYN Resident, PGY-2  2017 11:35 AM        Physician Attestation   I, Marci William, personally examined and evaluated this patient.  I discussed the patient with the resident and care team, and agree with the assessment and plan of care as documented in the resident s note of 2017  [date].      I personally reviewed vital signs, medications, labs and  exam.    Key findings: s/p BMZ x2. No signs of  labor off of tocolysis. Category 1 reactive NST. Discharge to home. Reviewed s/sx  labor, when to call or come back. Follow up in clinic in 1 week. Planning to transfer care to [place] in near future.   Marci William  Date of Service (when I saw the patient): 17

## 2017-02-13 NOTE — DISCHARGE SUMMARY
[facility] Discharge Summary    Gisele Jose MRN# [id number]   Age: 26 year old YOB: 1990     Date of Admission:  2017  Date of Discharge:  2017   Admitting Physician:  Julisa Powell MD  Discharge Physician:  Marci William MD     Admission Diagnosis:  - IUP at 31w3d   - Threatened  labor  - Anxiety  - History of seizure disorder    Discharge Diagnosis:  - Same, s/p extended monitoring    Procedures:    - S/p betamethasone on  and   - S/p Nifedipine through betamethasone window    Consultations:    - None    Medications prior to admission:  Prescriptions Prior to Admission   Medication Sig Dispense Refill Last Dose     HydrOXYzine Pamoate (VISTARIL PO)    2/10/2017 at Unknown time     LevETIRAcetam (KEPPRA PO) Take 1,000 mg by mouth   2017 at Unknown time     Topiramate (TOPAMAX PO) Take 25 mg by mouth   2017 at Unknown time     clobazam (ONFI) tablet Take 10 mg by mouth daily   2017 at Unknown time     Lacosamide (VIMPAT PO) Take 200 mg by mouth   2017 at Unknown time     sertraline (ZOLOFT) 100 MG tablet Take 100 mg by mouth daily   2017 at Unknown time     Prenatal Vit-Fe Fumarate-FA (PRENATAL MULTIVITAMIN  PLUS IRON) 27-0.8 MG TABS Take 1 tablet by mouth daily   2017 at Unknown time     OXYCODONE HCL PO Take 10 mg by mouth every 6 hours as needed   More than a month at Unknown time     [DISCONTINUED] Acetaminophen (TYLENOL PO) Take 1,000 mg by mouth every 6 hours as needed for mild pain or fever   Unknown at Unknown time         Brief History of Presentation:   Gisele Jose is a 26 year old  who was admitted at 31w3d by LMP for extended monitoring d/t cervical change concerning for threatened  labor.  Her pregnancy was complicated by: anxiety and history of seizure disorder.    Hospital Course:    On admission, patient was started on nifedipine for tocolysis and she received a  course of betamethasone. Extended monitoring was employed throughout her betamethasone window.  She was then watched overnight after discontinuation of nifedipine without return of contraction like activity.  Patient's cervical exam did not show any further change and thus she was discharged on hospital day 3.  SVE on discharge was 1 cm external os/closed internal os/long/high.    Discharge Instructions:  Call or present to labor and delivery if you experience:   -Regular painful contractions concerning for labor   -Leakage of fluid concerning for ruptured membranes   -Decreased fetal movement   -Bright red vaginal bleeding    -Headache, vision changes, upper abdominal pain, significant increase in swelling,   generalized unwell feeling    Follow up:  Follow up with primary OBGYN in 1-2 weeks.      Discharge Medications:  Current Discharge Medication List      CONTINUE these medications which have CHANGED    Details   acetaminophen (TYLENOL) 325 MG tablet Take 2 tablets (650 mg) by mouth every 6 hours as needed for mild pain or fever  Qty: 100 tablet, Refills: 0    Associated Diagnoses: Pregnancy, incidental         CONTINUE these medications which have NOT CHANGED    Details   HydrOXYzine Pamoate (VISTARIL PO)       LevETIRAcetam (KEPPRA PO) Take 1,000 mg by mouth      Topiramate (TOPAMAX PO) Take 25 mg by mouth      clobazam (ONFI) tablet Take 10 mg by mouth daily      Lacosamide (VIMPAT PO) Take 200 mg by mouth      sertraline (ZOLOFT) 100 MG tablet Take 100 mg by mouth daily      Prenatal Vit-Fe Fumarate-FA (PRENATAL MULTIVITAMIN  PLUS IRON) 27-0.8 MG TABS Take 1 tablet by mouth daily      OXYCODONE HCL PO Take 10 mg by mouth every 6 hours as needed             Saadia Falk MD  OB/GYN Resident, PGY-2  2/13/2017 11:45 AM          Agree with above documentation.  Marci William MD

## 2017-02-13 NOTE — PLAN OF CARE
D: Written discharge instructions reviewed with patient. No questions at this time. Will follow up Friday at the Neuro MD and follow up at the OB MD in 1-2 weeks and verbalizing understanding. Discharge to home undelivered.

## 2017-02-13 NOTE — PLAN OF CARE
Patient slept through night. Reported 2 auras before falling asleep, no seizures. Denies pain, bleeding, loss of fluid. States rare contractions before falling asleep. VSS; EFM as charted. Continue expectant management.

## 2017-02-13 NOTE — DISCHARGE INSTRUCTIONS
Discharge Instruction for Undelivered Patients      You were seen for: Labor Assessment  We Consulted: Dr. William  You had (Test or Medicine):monitoring, cervical check     Diet:   Drink 8 to 12 glasses of liquids (milk, juice, water) every day.  You may eat meals and snacks.     Activity:  Call your doctor or nurse midwife if your baby is moving less than usual.     Call your provider if you notice:  Swelling in your face or increased swelling in your hands or legs.  Headaches that are not relieved by Tylenol (acetaminophen).  Changes in your vision (blurring: seeing spots or stars.)  Nausea (sick to your stomach) and vomiting (throwing up).   Weight gain of 5 pounds or more per week.  Heartburn that doesn't go away.  Signs of bladder infection: pain when you urinate (use the toilet), need to go more often and more urgently.  The bag of benitez (rupture of membranes) breaks, or you notice leaking in your underwear.  Bright red blood in your underwear.  Abdominal (lower belly) or stomach pain.  For first baby: Contractions (tightening) less than 5 minutes apart for one hour or more.  Second (plus) baby: Contractions (tightening) less than 10 minutes apart and getting stronger.  *If less than 34 weeks: Contractions (tightenings) more than 6 times in one hour.  Increase or change in vaginal discharge (note the color and amount)      Follow-up:  As scheduled in the clinic

## 2017-02-16 ENCOUNTER — HOSPITAL ENCOUNTER (OUTPATIENT)
Facility: CLINIC | Age: 27
Discharge: HOME | End: 2017-02-16
Attending: OBSTETRICS & GYNECOLOGY | Admitting: OBSTETRICS & GYNECOLOGY
Payer: MEDICAID

## 2017-02-16 ENCOUNTER — PRENATAL OFFICE VISIT (OUTPATIENT)
Dept: OBGYN | Facility: CLINIC | Age: 27
End: 2017-02-16
Payer: MEDICAID

## 2017-02-16 VITALS
BODY MASS INDEX: 23.19 KG/M2 | HEIGHT: 68 IN | SYSTOLIC BLOOD PRESSURE: 117 MMHG | TEMPERATURE: 97.8 F | WEIGHT: 153 LBS | RESPIRATION RATE: 18 BRPM | HEART RATE: 84 BPM | DIASTOLIC BLOOD PRESSURE: 77 MMHG

## 2017-02-16 VITALS
OXYGEN SATURATION: 95 % | HEART RATE: 106 BPM | DIASTOLIC BLOOD PRESSURE: 75 MMHG | WEIGHT: 157.5 LBS | SYSTOLIC BLOOD PRESSURE: 114 MMHG | BODY MASS INDEX: 23.95 KG/M2 | TEMPERATURE: 98.6 F

## 2017-02-16 DIAGNOSIS — Z34.00 ENCOUNTER FOR SUPERVISION OF NORMAL FIRST PREGNANCY, UNSPECIFIED TRIMESTER: Primary | ICD-10-CM

## 2017-02-16 PROBLEM — Z34.90 PREGNANCY: Status: ACTIVE | Noted: 2017-02-16

## 2017-02-16 PROCEDURE — 99213 OFFICE O/P EST LOW 20 MIN: CPT | Mod: GC | Performed by: OBSTETRICS & GYNECOLOGY

## 2017-02-16 PROCEDURE — 99207 ZZC PRENATAL VISIT: CPT | Performed by: OBSTETRICS & GYNECOLOGY

## 2017-02-16 PROCEDURE — 99213 OFFICE O/P EST LOW 20 MIN: CPT

## 2017-02-16 RX ORDER — ACETAMINOPHEN 325 MG/1
650-975 TABLET ORAL EVERY 4 HOURS PRN
Status: DISCONTINUED | OUTPATIENT
Start: 2017-02-16 | End: 2017-02-16 | Stop reason: HOSPADM

## 2017-02-16 RX ORDER — ONDANSETRON 2 MG/ML
4 INJECTION INTRAMUSCULAR; INTRAVENOUS EVERY 6 HOURS PRN
Status: DISCONTINUED | OUTPATIENT
Start: 2017-02-16 | End: 2017-02-16 | Stop reason: HOSPADM

## 2017-02-16 NOTE — MR AVS SNAPSHOT
MRN:[id number]                      After Visit Summary   2/16/2017    Gisele Jose    MRN: [id number]           [employer] Information     [employer] gives you secure access to your electronic health record. If you see a primary care provider, you can also send messages to your care team and make appointments. If you have questions, please call your primary care clinic.  If you do not have a primary care provider, please call [phone] and they will assist you.        Care EveryWhere ID     This is your Care EveryWhere ID. This could be used by other organizations to access your [facility] medical records  [id number]

## 2017-02-16 NOTE — IP AVS SNAPSHOT
[facility]    [address]    [place] [address]    Phone:  [phone]                                       After Visit Summary   2/16/2017    Gisele Jose    MRN: [id number]           After Visit Summary Signature Page     I have received my discharge instructions, and my questions have been answered. I have discussed any challenges I see with this plan with the nurse or doctor.    ..........................................................................................................................................  Patient/Patient Representative Signature      ..........................................................................................................................................  Patient Representative Print Name and Relationship to Patient    ..................................................               ................................................  Date                                            Time    ..........................................................................................................................................  Reviewed by Signature/Title    ...................................................              ..............................................  Date                                                            Time

## 2017-02-16 NOTE — IP AVS SNAPSHOT
MRN:[id number]                      After Visit Summary   2/16/2017    Gisele Jose    MRN: [id number]           Thank you!     Thank you for choosing [facility] for your care. Our goal is always to provide you with excellent care. Hearing back from our patients is one way we can continue to improve our services. Please take a few minutes to complete the written survey that you may receive in the mail after you visit with us. Thank you!        Patient Information     Date Of Birth          1990        About your hospital stay     You were admitted on:  February 16, 2017 You last received care in the:   [facility]    You were discharged on:  February 16, 2017        Reason for your hospital stay       You were seen in triage for evaluation of your baby and contractions.  Your cervix was not changed and your baby looked great on the monitor.                  Who to Call     For medical emergencies, please call 911.  For non-urgent questions about your medical care, please call your primary care provider or clinic, [phone]          Attending Provider     Provider Specialty    Tg Murillo MD OB/Gyn       Primary Care Provider Office Phone # Fax #    Marci HELEN [facility] [phone]       [address] [address]        After Care Instructions     Activity       You can resume your normal activities of daily living.            Diet       Regular                  Follow-up Appointments     [facility]ult [facility] Follow-up and recommended labs and tests       Follow up with your [facility] as previously scheduled.                  Further instructions from your care team       Discharge Instructions for Undelivered Patients    Diet:  * Drink 8 to 12 glasses of liquids (milk, juice, water) every day  * You may eat meals and snacks.    Activity:  ** Count fetal kicks every day   * Call your doctor or nurse midwife if your baby is moving  "less than usual.    Call your provider if you notice:  * Swelling in your face or increased swelling in your hands or legs.  * Headaches that are not relieved by Tylenol (acetaminophen).  * Changes in your vision (blurring; seeing spots or stars).  * Nausea (sick to your stomach) and vomiting (throwing up).  * Weight gain of 5 pounds per week.  * Heartburn that doesn't go away.  * Signs of bladder infection: Pain when you urinate (use the toilet), needing to go more often or more urgently.  * The bag of benitez (membrane) breaks, or you notice leaking in your underwear.  * Bright red blood in your underwear.  * Abdominal (lower belly) or stomach pain.  * For first baby: Contractions (tightenings) less than 5 minutes apart for one hour or more.  * Second (plus) baby: Contractions (tightenings) less than 10 minutes apart and getting stronger.  * Increase or change in vaginal discharge (note the color and amount).          Pending Results     No orders found from 2/14/2017 to 2/17/2017.            Statement of Approval     Ordered          02/16/17 1904  I have reviewed and agree with all the recommendations and orders detailed in this document.  EFFECTIVE NOW     Approved and electronically signed by:  aMdonna Doherty MD           02/16/17 1901  I have reviewed and agree with all the recommendations and orders detailed in this document.  EFFECTIVE NOW     Approved and electronically signed by:  Tg Murillo MD             Admission Information     Date & Time Provider Department Dept. Phone    2/16/2017 Tg Murillo MD [facility] [phone]      Your Vitals Were     Blood Pressure Pulse Temperature Respirations Height Weight    117/77 84 97.8  F (36.6  C) (Oral) 18 1.727 m (5' 8\") 69.4 kg (153 lb)    Last Period BMI (Body Mass Index)                07/06/2016 (Exact Date) 23.26 kg/m2          MyChart Information     [employer] gives you secure access to your electronic health record. If you see a primary care provider, " you can also send messages to your care team and make appointments. If you have questions, please call your primary care clinic.  If you do not have a primary care provider, please call [phone] and they will assist you.        Care EveryWhere ID     This is your Care EveryWhere ID. This could be used by other organizations to access your [facility] medical records  [id number]           Review of your medicines      CONTINUE these medicines which have NOT CHANGED        Dose / Directions    acetaminophen 325 MG tablet   Commonly known as:  TYLENOL   Used for:  Pregnancy, incidental        Dose:  650 mg   Take 2 tablets (650 mg) by mouth every 6 hours as needed for mild pain or fever   Quantity:  100 tablet   Refills:  0       clobazam tablet   Commonly known as:  ONFI        Dose:  10 mg   Take 10 mg by mouth daily   Refills:  0       KEPPRA PO        Dose:  1000 mg   Take 1,000 mg by mouth   Refills:  0       OXYCODONE HCL PO        Dose:  10 mg   Take 10 mg by mouth every 6 hours as needed   Refills:  0       prenatal multivitamin  plus iron 27-0.8 MG Tabs per tablet   Indication:  Pregnancy        Dose:  1 tablet   Take 1 tablet by mouth daily   Refills:  0       TOPAMAX PO        Dose:  25 mg   Take 25 mg by mouth   Refills:  0       VIMPAT PO        Dose:  200 mg   Take 200 mg by mouth   Refills:  0       VISTARIL PO        Refills:  0       ZOLOFT 100 MG tablet   Generic drug:  sertraline        Dose:  100 mg   Take 100 mg by mouth daily   Refills:  0                Protect others around you: Learn how to safely use, store and throw away your medicines at www.disposemymeds.org.             Medication List: This is a list of all your medications and when to take them. Check marks below indicate your daily home schedule. Keep this list as a reference.      Medications           Morning Afternoon Evening Bedtime As Needed    acetaminophen 325 MG tablet   Commonly known as:  TYLENOL   Take 2 tablets (650 mg)  by mouth every 6 hours as needed for mild pain or fever                                clobazam tablet   Commonly known as:  ONFI   Take 10 mg by mouth daily                                KEPPRA PO   Take 1,000 mg by mouth                                OXYCODONE HCL PO   Take 10 mg by mouth every 6 hours as needed                                prenatal multivitamin  plus iron 27-0.8 MG Tabs per tablet   Take 1 tablet by mouth daily                                TOPAMAX PO   Take 25 mg by mouth                                VIMPAT PO   Take 200 mg by mouth                                VISTARIL PO                                ZOLOFT 100 MG tablet   Take 100 mg by mouth daily   Generic drug:  sertraline

## 2017-02-17 ENCOUNTER — TRANSFERRED RECORDS (OUTPATIENT)
Dept: HEALTH INFORMATION MANAGEMENT | Facility: CLINIC | Age: 27
End: 2017-02-17

## 2017-02-17 NOTE — PLAN OF CARE
[facility] visit. She states she is feeling some pressure. She began to feel the pressure after lifting her large dog at home. She has concerns that she is in  labor. Pt placed on monitor and MD notified.

## 2017-02-17 NOTE — PLAN OF CARE
Contractions are rare. Baby has good variability with accelerations present. Cervix unchanged from last visit. Discharge instructions explained and understood. DC to home.

## 2017-02-17 NOTE — DISCHARGE INSTRUCTIONS
Discharge Instructions for Undelivered Patients    Diet:  * Drink 8 to 12 glasses of liquids (milk, juice, water) every day  * You may eat meals and snacks.    Activity:  ** Count fetal kicks every day   * Call your doctor or nurse midwife if your baby is moving less than usual.    Call your provider if you notice:  * Swelling in your face or increased swelling in your hands or legs.  * Headaches that are not relieved by Tylenol (acetaminophen).  * Changes in your vision (blurring; seeing spots or stars).  * Nausea (sick to your stomach) and vomiting (throwing up).  * Weight gain of 5 pounds per week.  * Heartburn that doesn't go away.  * Signs of bladder infection: Pain when you urinate (use the toilet), needing to go more often or more urgently.  * The bag of benitez (membrane) breaks, or you notice leaking in your underwear.  * Bright red blood in your underwear.  * Abdominal (lower belly) or stomach pain.  * For first baby: Contractions (tightenings) less than 5 minutes apart for one hour or more.  * Second (plus) baby: Contractions (tightenings) less than 10 minutes apart and getting stronger.  * Increase or change in vaginal discharge (note the color and amount).

## 2017-02-17 NOTE — PROGRESS NOTES
Triage Progress Note    HPI: Gisele Jose is a 26 year old  at 32w1d by LMP who presents today with complaints of low abdominal pressure and a couple of contractions.  She was recently admitted from 17-17 for rule out  labor and received BMZ on  and .  Her cervix was unchanged during that hospitalization.  Today she reports that she lifted and carried her 75lbs dog outside and then felt low abdominal pressure and some contractions.  She went to her [facility] intake visit and then came the [facility] clinic for her hospital follow up and mentioned that she was feeling pressure and some contractions, so they sent her to triage for evaluation.  She reports that she has only felt a few contractions since being here.  She denies any vaginal bleeding or leakage of fluid.  She reports normal fetal movement.  She denies any new or different vaginal discharge or urinary symptoms.      Pregnancy notable for:  - asthma  -depression/ anxiety  - epilepsy  -rubella non-immune  -migraines    OBHX:   Obstetric History       T0      TAB0   SAB0   E0   M0   L0       # Outcome Date GA Lbr [facility]/2nd Weight Sex Delivery Anes PTL Lv   1 Current                   MedicalHX:   Past Medical History   Diagnosis Date     Allergic rhinitis      Anemia      Anxiety      triggers seizure     ASCUS on Pap smear 2014     age 23     ASCUS with positive high risk HPV cervical 16     ASCUS pap, + HR HPV (not 16 or 18). Pt is currently pregnant.      Asthma, intermittent      Depression      Insomnia      triggers seizure     Migraine      Papanicolaou smear of cervix with low grade squamous intraepithelial lesion (LGSIL) 08     BRIANNA I     Seizure disorder, partial dx age 6     Epilepsy, mult triggers.  [facility] every 6 mos       SurgicalHX:   Past Surgical History   Procedure Laterality Date     Surgical history of -        Brain Mapping.  No resection as affect Motor Skills     Surgical  "history of -   2010     R Frontoparietal craniotomy for seizure mappingl      Appendectomy  10/8/16       Medications:     No current facility-administered medications on file prior to encounter.   Current Outpatient Prescriptions on File Prior to Encounter:  acetaminophen (TYLENOL) 325 MG tablet Take 2 tablets (650 mg) by mouth every 6 hours as needed for mild pain or fever   HydrOXYzine Pamoate (VISTARIL PO)    LevETIRAcetam (KEPPRA PO) Take 1,000 mg by mouth   Topiramate (TOPAMAX PO) Take 25 mg by mouth   clobazam (ONFI) tablet Take 10 mg by mouth daily   Lacosamide (VIMPAT PO) Take 200 mg by mouth   sertraline (ZOLOFT) 100 MG tablet Take 100 mg by mouth daily   OXYCODONE HCL PO Take 10 mg by mouth every 6 hours as needed   Prenatal Vit-Fe Fumarate-FA (PRENATAL MULTIVITAMIN  PLUS IRON) 27-0.8 MG TABS Take 1 tablet by mouth daily       Allergies:   Allergies   Allergen Reactions     Diphenhydramine Other (See Comments)     Human Papillomavirus, 16,18 Other (See Comments)     Seizures after vaccine     No Clinical Screening - See Comments Nausea and Vomiting     bananas     Sulfa Drugs Hives     Pt got hives when 1 years old from taking sulfa drugs.     FamilyHX:  Non-contributory.      SocialHX: Patient denies any tobacco, alcohol, or drug use      ROS: 10-point ROS negative except as in HPI     Physical Exam:  Vitals:    02/16/17 1746   BP: 117/77   Pulse: 84   Resp: 18   Temp: 97.8  F (36.6  C)   TempSrc: Oral   Weight: 69.4 kg (153 lb)   Height: 1.727 m (5' 8\")     GEN: resting comfortably in bed, NAD.  HEENT grossly normal.  Neck supple  Cardiac:  Rate regular.  Pulmonary:  Respirations regular.     ABD: soft, gravid, non-tender, non-distended  EXT: no edema, non- tender to palpation  CVX: per RN external os 1cm, internal os closed/ long/high  SKIN:  Normal  NEURO/PSYCH: Appropriate mood and affect.      NST:  FHT: baseline 135, moderate variability, + accels, no decels  TOCO: rare contractions, some uterine " irritability     A/P: Gisele Jose is a 26 year old  at 32w1d by LMP who is admitted for rule out  labor.    Rule out  labor:  Patient had full infectious work up on 17 which was negative.  She has not had any change in vaginal discharge or new urinary symptoms, so she does not need repeat infectious testing.  Her cervix is unchanged since discharge from the hospital on .  She only had two contractions over the course of 1 hour and 15 minutes.  She is not in  labor.  Patient is transferring care to [facility] and has initial OB appointment with the MD there on 17.  She will be discharged home tonight, call/return prn.     FHT: Category 1, reactive    Madonna Doherty  2017 6:55 PM     Patient evaluated and examined by me.  Agree with resident note.      Tg Murillo MD

## 2017-02-23 NOTE — TELEPHONE ENCOUNTER
sertraline (ZOLOFT) 100 MG tablet     Last Written Prescription Date: -  Last Fill Quantity: -, # refills: -  Last Office Visit with [facility] primary care provider:  10/17/16        Last PHQ-9 score on record=   PHQ-9 SCORE 1/10/2017   Total Score -   Total Score 3

## 2017-02-24 RX ORDER — SERTRALINE HYDROCHLORIDE 100 MG/1
100 TABLET, FILM COATED ORAL DAILY
Qty: 30 TABLET | Refills: 11 | Status: CANCELLED | OUTPATIENT
Start: 2017-02-24

## 2017-02-24 NOTE — TELEPHONE ENCOUNTER
Routing refill request to provider for review/approval because:  Medication is reported/historical

## 2017-02-24 NOTE — TELEPHONE ENCOUNTER
Has she been off if for some time? Would recommend she contact her ob clinic given the pregnancy. They are probably managing this for her. If she has been on it all along then we can fill.     Malcolm Francois

## 2017-02-28 NOTE — TELEPHONE ENCOUNTER
Left message to call back and ask to speak with an available triage nurse.    MIYA DumontN, RN

## 2017-02-28 NOTE — TELEPHONE ENCOUNTER
She has been on sertraline through her whole pregnancy. MD comments relayed to her and she agrees to call her OB clinic for refill. She is in her third trimester    Laura Freed RN, BSN

## 2017-03-03 ENCOUNTER — TRANSFERRED RECORDS (OUTPATIENT)
Dept: HEALTH INFORMATION MANAGEMENT | Facility: CLINIC | Age: 27
End: 2017-03-03

## 2017-03-10 ENCOUNTER — HOSPITAL ENCOUNTER (OUTPATIENT)
Facility: CLINIC | Age: 27
End: 2017-03-10
Admitting: OBSTETRICS & GYNECOLOGY
Payer: COMMERCIAL

## 2017-04-14 ENCOUNTER — TRANSFERRED RECORDS (OUTPATIENT)
Dept: HEALTH INFORMATION MANAGEMENT | Facility: CLINIC | Age: 27
End: 2017-04-14

## 2017-07-25 ENCOUNTER — TRANSFERRED RECORDS (OUTPATIENT)
Dept: HEALTH INFORMATION MANAGEMENT | Facility: CLINIC | Age: 27
End: 2017-07-25

## 2017-09-08 ENCOUNTER — TRANSFERRED RECORDS (OUTPATIENT)
Dept: HEALTH INFORMATION MANAGEMENT | Facility: CLINIC | Age: 27
End: 2017-09-08

## 2017-09-29 ENCOUNTER — PRE VISIT (OUTPATIENT)
Dept: GASTROENTEROLOGY | Facility: CLINIC | Age: 27
End: 2017-09-29

## 2017-10-10 ENCOUNTER — CARE COORDINATION (OUTPATIENT)
Dept: GASTROENTEROLOGY | Facility: CLINIC | Age: 27
End: 2017-10-10

## 2017-10-10 ENCOUNTER — OFFICE VISIT (OUTPATIENT)
Dept: GASTROENTEROLOGY | Facility: CLINIC | Age: 27
End: 2017-10-10

## 2017-10-10 VITALS
HEART RATE: 69 BPM | SYSTOLIC BLOOD PRESSURE: 110 MMHG | WEIGHT: 143.2 LBS | OXYGEN SATURATION: 98 % | HEIGHT: 68 IN | DIASTOLIC BLOOD PRESSURE: 83 MMHG | BODY MASS INDEX: 21.7 KG/M2 | TEMPERATURE: 94.3 F

## 2017-10-10 DIAGNOSIS — K62.89 PROCTITIS: ICD-10-CM

## 2017-10-10 DIAGNOSIS — R19.8 IRREGULAR BOWEL HABITS: ICD-10-CM

## 2017-10-10 DIAGNOSIS — K62.89 PROCTITIS: Primary | ICD-10-CM

## 2017-10-10 DIAGNOSIS — A04.72 C. DIFFICILE COLITIS: Primary | ICD-10-CM

## 2017-10-10 LAB
ALBUMIN SERPL-MCNC: 4.2 G/DL (ref 3.4–5)
ALP SERPL-CCNC: 60 U/L (ref 40–150)
ALT SERPL W P-5'-P-CCNC: 27 U/L (ref 0–50)
ANION GAP SERPL CALCULATED.3IONS-SCNC: 5 MMOL/L (ref 3–14)
AST SERPL W P-5'-P-CCNC: 14 U/L (ref 0–45)
BASOPHILS # BLD AUTO: 0 10E9/L (ref 0–0.2)
BASOPHILS NFR BLD AUTO: 0.6 %
BILIRUB DIRECT SERPL-MCNC: <0.1 MG/DL (ref 0–0.2)
BILIRUB SERPL-MCNC: 0.3 MG/DL (ref 0.2–1.3)
BUN SERPL-MCNC: 12 MG/DL (ref 7–30)
C COLI+JEJUNI+LARI FUSA STL QL NAA+PROBE: NOT DETECTED
C DIFF TOX B STL QL: POSITIVE
CALCIUM SERPL-MCNC: 8.7 MG/DL (ref 8.5–10.1)
CHLORIDE SERPL-SCNC: 105 MMOL/L (ref 94–109)
CO2 SERPL-SCNC: 27 MMOL/L (ref 20–32)
CREAT SERPL-MCNC: 0.76 MG/DL (ref 0.52–1.04)
CRP SERPL-MCNC: <2.9 MG/L (ref 0–8)
DIFFERENTIAL METHOD BLD: NORMAL
EC STX1 GENE STL QL NAA+PROBE: NOT DETECTED
EC STX2 GENE STL QL NAA+PROBE: NOT DETECTED
ENTERIC PATHOGEN COMMENT: NORMAL
EOSINOPHIL # BLD AUTO: 0.1 10E9/L (ref 0–0.7)
EOSINOPHIL NFR BLD AUTO: 2.7 %
ERYTHROCYTE [DISTWIDTH] IN BLOOD BY AUTOMATED COUNT: 11.5 % (ref 10–15)
ERYTHROCYTE [SEDIMENTATION RATE] IN BLOOD BY WESTERGREN METHOD: 4 MM/H (ref 0–20)
FERRITIN SERPL-MCNC: 41 NG/ML (ref 12–150)
FOLATE SERPL-MCNC: 29 NG/ML
GFR SERPL CREATININE-BSD FRML MDRD: >90 ML/MIN/1.7M2
GLUCOSE SERPL-MCNC: 87 MG/DL (ref 70–99)
HCT VFR BLD AUTO: 43.1 % (ref 35–47)
HGB BLD-MCNC: 14.6 G/DL (ref 11.7–15.7)
IGA SERPL-MCNC: 180 MG/DL (ref 70–380)
IMM GRANULOCYTES # BLD: 0 10E9/L (ref 0–0.4)
IMM GRANULOCYTES NFR BLD: 0.2 %
IRON SATN MFR SERPL: 34 % (ref 15–46)
IRON SERPL-MCNC: 110 UG/DL (ref 35–180)
LYMPHOCYTES # BLD AUTO: 1.5 10E9/L (ref 0.8–5.3)
LYMPHOCYTES NFR BLD AUTO: 30.2 %
MCH RBC QN AUTO: 31.1 PG (ref 26.5–33)
MCHC RBC AUTO-ENTMCNC: 33.9 G/DL (ref 31.5–36.5)
MCV RBC AUTO: 92 FL (ref 78–100)
MONOCYTES # BLD AUTO: 0.4 10E9/L (ref 0–1.3)
MONOCYTES NFR BLD AUTO: 7.2 %
NEUTROPHILS # BLD AUTO: 2.9 10E9/L (ref 1.6–8.3)
NEUTROPHILS NFR BLD AUTO: 59.1 %
NOROV GI+II ORF1-ORF2 JNC STL QL NAA+PR: NOT DETECTED
NRBC # BLD AUTO: 0 10*3/UL
NRBC BLD AUTO-RTO: 0 /100
PLATELET # BLD AUTO: 208 10E9/L (ref 150–450)
POTASSIUM SERPL-SCNC: 3.9 MMOL/L (ref 3.4–5.3)
PROT SERPL-MCNC: 8 G/DL (ref 6.8–8.8)
RBC # BLD AUTO: 4.7 10E12/L (ref 3.8–5.2)
RVA NSP5 STL QL NAA+PROBE: NOT DETECTED
SALMONELLA SP RPOD STL QL NAA+PROBE: NOT DETECTED
SHIGELLA SP+EIEC IPAH STL QL NAA+PROBE: NOT DETECTED
SODIUM SERPL-SCNC: 137 MMOL/L (ref 133–144)
SPECIMEN SOURCE: ABNORMAL
TIBC SERPL-MCNC: 327 UG/DL (ref 240–430)
V CHOL+PARA RFBL+TRKH+TNAA STL QL NAA+PR: NOT DETECTED
VIT B12 SERPL-MCNC: 456 PG/ML (ref 193–986)
WBC # BLD AUTO: 4.8 10E9/L (ref 4–11)
Y ENTERO RECN STL QL NAA+PROBE: NOT DETECTED

## 2017-10-10 PROCEDURE — 82784 ASSAY IGA/IGD/IGG/IGM EACH: CPT | Performed by: INTERNAL MEDICINE

## 2017-10-10 PROCEDURE — 87209 SMEAR COMPLEX STAIN: CPT | Performed by: INTERNAL MEDICINE

## 2017-10-10 PROCEDURE — 83516 IMMUNOASSAY NONANTIBODY: CPT | Mod: 91 | Performed by: INTERNAL MEDICINE

## 2017-10-10 PROCEDURE — 87506 IADNA-DNA/RNA PROBE TQ 6-11: CPT | Performed by: INTERNAL MEDICINE

## 2017-10-10 PROCEDURE — 82746 ASSAY OF FOLIC ACID SERUM: CPT | Performed by: INTERNAL MEDICINE

## 2017-10-10 PROCEDURE — 87493 C DIFF AMPLIFIED PROBE: CPT | Performed by: INTERNAL MEDICINE

## 2017-10-10 PROCEDURE — 83516 IMMUNOASSAY NONANTIBODY: CPT | Performed by: INTERNAL MEDICINE

## 2017-10-10 PROCEDURE — 87177 OVA AND PARASITES SMEARS: CPT | Performed by: INTERNAL MEDICINE

## 2017-10-10 PROCEDURE — 87329 GIARDIA AG IA: CPT | Performed by: INTERNAL MEDICINE

## 2017-10-10 PROCEDURE — 83993 ASSAY FOR CALPROTECTIN FECAL: CPT | Performed by: INTERNAL MEDICINE

## 2017-10-10 RX ORDER — MULTIPLE VITAMINS W/ MINERALS TAB 9MG-400MCG
1 TAB ORAL DAILY
COMMUNITY

## 2017-10-10 RX ORDER — VANCOMYCIN HYDROCHLORIDE 125 MG/1
125 CAPSULE ORAL 4 TIMES DAILY
Qty: 56 CAPSULE | Refills: 0 | Status: SHIPPED | OUTPATIENT
Start: 2017-10-10 | End: 2017-10-24

## 2017-10-10 ASSESSMENT — PAIN SCALES - GENERAL: PAINLEVEL: SEVERE PAIN (6)

## 2017-10-10 NOTE — LETTER
10/10/2017       RE: Gisele Jose  [address] [place] [address]     Dear Colleague,    Thank you for referring your patient, Gisele Jose, to the [facility] at [facility]. Please see a copy of my visit note below.    GI CLINIC VISIT - NEW PATIENT    CC/REFERRING MD:   Referred Self    REASON FOR CONSULTATION:  History of proctitis    HPI:  27 year old female with history of epilepsy (medically refractory), depression/anxiety, migraines, medical cannabis use, eating disorder, intentional drug overdose is here to be seen in the GI clinic for evaluation of a history of proctitis.    She reports that in 2015 she had 3-4 months of rectal bleeding in the setting of constipation with irregular bowel habits. She was seen by JUDI of colorectal and appeared have proctitis on exam. She underwent a colonoscopy by Dr. Schaeffer of colorectal in 10/2017. This exam showed a normal ileum and mostly normal colon but evidence of inflammation in the rectum. Biopsies of the ileum, right and left colon were normal. Biopsies of the rectum showed active proctitis. She was initially treated with flagyl with incomplete response. Dr. Schaeffer then gave her pentasa orally and canasa suppositories. She reports these meds were expensive. Her abd pain improved but the bleeding did not change. After a month or so she stopped.    She then became pregnant with her son, Berny. During this time her GI symptoms completely resolved. Then starting about 1 month after Berny was born her symptoms began to return. She has had left sided and lower abdominal discomfort. +fecal urgency with tenesmus. Only 1 episode of rectal bleeding was 2 weeks ago but then resolved. She has 4 BMs per day. Often will just pass flatus. Stools irregular and sometimes she also feels constipated. She denies any EIM of IBD.    She has been taking NSAIDs (200 mg ibuprofen) daily for  the last month or so for GANDARA.    Her seizure disorder has been poorly controlled and she is working with her neurology team to continue to manage this.    She also had an appendectomy when she was 14 weeks pregnant.    ROS:  10pt ROS performed and otherwise negative.    PERTINENT PAST MEDICAL HISTORY:  Past Medical History:   Diagnosis Date     Allergic rhinitis      Anemia      Anxiety     triggers seizure     ASCUS on Pap smear 1/2014    age 23     ASCUS with positive high risk HPV cervical 09/14/16    ASCUS pap, + HR HPV (not 16 or 18). Pt is currently pregnant.      Asthma, intermittent      Depression      Insomnia     triggers seizure     Migraine      Papanicolaou smear of cervix with low grade squamous intraepithelial lesion (LGSIL) 9/24/08    BRIANNA I     Seizure disorder, partial dx age 6    Epilepsy, mult triggers.  [facility] every 6 mos         PREVIOUS ABDOMINAL/GYNECOLOGIC SURGERIES:  Past Surgical History:   Procedure Laterality Date     APPENDECTOMY  10/8/16     SURGICAL HISTORY OF -   2010    Brain Mapping.  No resection as affect Motor Skills     SURGICAL HISTORY OF -   2010    R Frontoparietal craniotomy for seizure mappingl          PREVIOUS ENDOSCOPY:  As above    PERTINENT MEDICATIONS:  Current Outpatient Prescriptions   Medication     multivitamin, therapeutic with minerals (MULTI-VITAMIN) TABS tablet     eslicarbazepine acetate (APTIOM) 400 MG tablet     NEW MED     acetaminophen (TYLENOL) 325 MG tablet     HydrOXYzine Pamoate (VISTARIL PO)     LevETIRAcetam (KEPPRA PO)     clobazam (ONFI) tablet     Lacosamide (VIMPAT PO)     sertraline (ZOLOFT) 100 MG tablet     No current facility-administered medications for this visit.      Ibuprofen 200 mg daily for the last month    Medications reviewed with patient today, see Medication List/Assessment for details.  No other NSAID/anticoagulation reported by patient.  No other OTC/herbal/supplements reported by patient.    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 Medical cannabis (vapes). No tobacco. Occ alcohol.     FAMILY HISTORY:  No colon/panc/esophageal/other GI CA, no other HNPCC-related Deven.  No IBD/celiac, no other AI/liver/thyroid disease.    PHYSICAL EXAMINATION:  Vitals reviewed, AFVSS  /83 (BP Location: Left arm, Patient Position: Chair, Cuff Size: Adult Regular)  Pulse 69  Temp 94.3  F (34.6  C)  Ht 1.727 m (5' 8\")  Wt 65 kg (143 lb 3.2 oz)  SpO2 98%  BMI 21.77 kg/m2  Gen: aaox3, cooperative, pleasant, not dyspneic/diaphoretic, nad  HEENT: ncat, neck supple, no clad/sclad, normal op w/o ulcer/exudate, anicteric, mmm  Resp/CV unremarkable  Abd:  Soft, mild tenderness in LLQ and lower abd. No distension, rebound or guarding  Ext: no c/c/e  Skin: warm, perfused, no jaundice  Neuro: grossly intact, no asterixis noted    PERTINENT STUDIES:  pending    Orders Only on 02/09/2017   Component Date Value Ref Range Status     Levetiracetam Level 02/09/2017 90.7*  Final     Topiramate Level 02/09/2017 3.7   Final     Lacosamide 02/09/2017 13.2*  Final     Desmethyllacosamide 02/09/2017 2.9   Final     Clobazam 02/09/2017 478*  Final     Desmethylclobazam 02/09/2017 1377   Final       ASSESSMENT/PLAN:  28 YO F with history of proctitis documented in 2015 here to discuss 5 months of urgency, gas, bloating, lower abdominal pain, tenesmus. The first order of business is to re-evaluate her mucosa with an ileo-colonoscopy. If proctitis still present we will also characterize with biopsies - I note that previous biopsies only showed active changes. If ulcerative proctitis is confirmed will maximize therapy with Canasa supp and possible steroid supp. Oral treatment could also be considered. Will ensure no progression of disease to more proximal bowel. Will also arrange MRE to ensure no evidence of Crohn's Disease. Labs today to check inflammatory markers and nutritional markers, etc. Stool studies to ensure no infection. We will " regroup after the above results return to discuss on the appropriate management. If all studies are normal then her symptoms could be related to IBS or other functional GI disorder. Appropriate recommendations will be made at that time if that is the case. We discussed the plan and she agrees to this course of action.    RTC 2-3 months, sooner if needed    Thank you for this consultation.  It was a pleasure to participate in the care of this patient; please contact us with any further questions.  A total of 60 minutes was spent with this patient, 50% of which was counseling regarding the above delineated issues.    Jose L Sotelo MD    [facility]   Department of Gastroenterology, Hepatology and Nutrition

## 2017-10-10 NOTE — PROGRESS NOTES
Pt stool study positive for c diff.  Sent in a rx for vancomycin.  Also order to hold on scheduling colonoscopy for at least one month.  Left voice mail message for pt to call to discuss.

## 2017-10-10 NOTE — MR AVS SNAPSHOT
After Visit Summary   10/10/2017    Gisele Jose    MRN: [id number]           Patient Information     Date Of Birth          1990        Visit Information        Provider Department      10/10/2017 9:00 AM Jose L Sotelo MD [facility]        Today's Diagnoses     Proctitis    -  1    Irregular bowel habits          Care Instructions    Good to meet you today!    We will get labs  Lab tests today at the 1st floor -- you will be notified of results by letter or my chart message in 7-10 days.  You will receive a phone call if more urgent follow up is needed.    We will get stool studies  You may  your stool sample containers at Lab 1st Floor  before you leave today.  You may drop off the stool samples at any [facility]    Schedule a colonoscopy with Dr. Sotelo under MAC  You will be called by the endoscopy   You will be mailed the instructions and prep  You will receive a call about one week prior by pre assessment nurse.     Schedule a MRI of your abdomen   [address]  Check in Room 1-327   You are scheduled on October 18   Check in time is 8  Nothing to eat or drink 6 hours prior  [phone] if you need to reschedule               When we have the results we will decide the best treatment plan    Follow up in 2 months or sooner if needed         For questions regarding your care Monday through Friday, contact the RN GI care coordinator,  Call   [phone] option 3 . Your call will be  returned same day, or if consultation is needed with the provider, it may be following business day - or you may send a My Chart message.    For medication refills (prescribed by the GI clinic), contact your pharmacy.    For appointment rescheduling/cancellation, contact [phone]     After hours, or if you have an immediate GI concern and cannot wait for a return call, contact the GI Fellow at [phone] and select  option #4.              Follow-ups after your visit        Your next 10 appointments already scheduled     Oct 18, 2017  9:00 AM CDT   MR ENTEROGRAPHY with UUMR1   [facility], [place], [facility] ([facility])    [address] [facility] [address] [phone]           Take your medicines as usual, unless your doctor tells you not to. Bring a list of your current medicines to your exam (including vitamins, minerals and over-the-counter drugs).  You will be asked to drink oral contrast for this exam. You will be given the oral contrast in MRI prior to your exam. Please arrive 60-90 minutes before your exam time to drink the oral contrast. To prepare:   The day before your exam, drink extra fluids   at least six 8-ounce glasses (unless your doctor tells you to restrict your fluids).   Have a blood test (creatinine test) within 30 days of your exam. Go to your clinic or Diagnostic Imaging Department for this test.  Do not eat or drink for 6 hours before your exam. If you need to take medicine, you may take it with small sips of water. (We may ask you to take liquid medicine as well.)  The MRI machine uses a strong magnet. Please wear clothes without metal (snaps, zippers). A sweatsuit works well, or we may give you a hospital gown.  Please remove any body piercings and hair extensions before you arrive. You will also remove watches, jewelry, hairpins, wallets, dentures, partial dental plates and hearing aids. You may wear contact lenses, and you may be able to wear your rings. We have a safe place to keep your personal items, but it is safer to leave them at home.  If you have any questions, please contact your Imaging Department exam site.            Dec 19, 2017 10:40 AM CST   (Arrive by 10:25 AM)   Return Visit with Jose L Sotelo MD   [facility] ([facility] [place])    [address] [place] [address]  [facility] [address] [phone]              Future tests that were ordered for you today     Open Future Orders        Priority Expected Expires Ordered    Tissue transglutaminase guzman IgA and IgG [WHH2541] Routine 10/10/2017 12/9/2017 10/10/2017    MR Enterography with and without Contrast [FEY8217] Routine  10/10/2018 10/10/2017    CBC with platelets differential [HWG728] Routine 10/10/2017 12/9/2017 10/10/2017    CRP inflammation [WNJ7614] Routine 10/10/2017 12/9/2017 10/10/2017    Erythrocyte sedimentation rate auto [NTW477] Routine 10/10/2017 12/9/2017 10/10/2017    Basic metabolic panel [LAB15] Routine 10/10/2017 12/9/2017 10/10/2017    Hepatic panel [LAB20] Routine 10/10/2017 12/9/2017 10/10/2017    Clostridium difficile toxin B PCR [QEE7112] Routine 10/10/2017 12/9/2017 10/10/2017    Ova and Parasite Exam Routine [QAD9017] Routine 10/10/2017 12/9/2017 10/10/2017    Giardia antigen [UQU676] Routine 10/10/2017 12/9/2017 10/10/2017    Enteric Bacteria and Virus Panel by ABIMAEL Stool [LVX4748] Routine 10/10/2017 12/9/2017 10/10/2017    Calprotectin Feces [JPU0805] Routine 10/10/2017 12/9/2017 10/10/2017    IgA [LAB73] Routine 10/10/2017 12/9/2017 10/10/2017            Who to contact     Please call your clinic at [phone] to:    Ask questions about your health    Make or cancel appointments    Discuss your medicines    Learn about your test results    Speak to your doctor   If you have compliments or concerns about an experience at your clinic, or if you wish to file a complaint, please contact [facility] Patient Relations at [phone] or email us at [email or web address]         Additional Information About Your Visit        MyChart Information     [employer] gives you secure access to your electronic health record. If you see a primary care provider, you can also send messages to your care team and make appointments. If you have questions, please call your  "primary care clinic.  If you do not have a primary care provider, please call [phone] and they will assist you.      [employer] is an electronic gateway that provides easy, online access to your medical records. With [employer], you can request a clinic appointment, read your test results, renew a prescription or communicate with your care team.     To access your existing account, please contact your [facility] or call [phone] for assistance.        Care EveryWhere ID     This is your Care EveryWhere ID. This could be used by other organizations to access your [facility] medical records  [id number]        Your Vitals Were     Pulse Temperature Height Pulse Oximetry BMI (Body Mass Index)       69 94.3  F (34.6  C) 1.727 m (5' 8\") 98% 21.77 kg/m2        Blood Pressure from Last 3 Encounters:   10/10/17 110/83   02/16/17 117/77   02/16/17 114/75    Weight from Last 3 Encounters:   10/10/17 65 kg (143 lb 3.2 oz)   02/16/17 69.4 kg (153 lb)   02/16/17 71.4 kg (157 lb 8 oz)              We Performed the Following     Ferritin [LAB68]     Folate [LAB69]     Iron and iron binding capacity [SJH637]     Vitamin B12 [LAB67]          Today's Medication Changes          These changes are accurate as of: 10/10/17  9:43 AM.  If you have any questions, ask your nurse or doctor.               Stop taking these medicines if you haven't already. Please contact your care team if you have questions.     tipranavir 250 MG capsule   Commonly known as:  APTIVUS   Stopped by:  Jose L Sotelo MD                    Primary Care Provider Office Phone # Fax #    Marci HELEN [facility] [phone] [address] [facility] [address]        Equal Access to Services     MARYAM CURTIS AH: Jayda Garcia, waaxda lujennie, qaybta kaalmamaribel steele. So [facility] [phone].    ATENCIÓN: Si shruthila españolmeryl a martinez " disposición servicios gratuitos de asistencia lingüística. Cecilia henrandez [phone].    We comply with applicable federal civil rights laws and [place] laws. We do not discriminate on the basis of race, color, national origin, age, disability, sex, sexual orientation, or gender identity.            Thank you!     Thank you for choosing [facility]IN[facility]  for your care. Our goal is always to provide you with excellent care. Hearing back from our patients is one way we can continue to improve our services. Please take a few minutes to complete the written survey that you may receive in the mail after your visit with us. Thank you!             Your Updated Medication List - Protect others around you: Learn how to safely use, store and throw away your medicines at www.disposemymeds.org.          This list is accurate as of: 10/10/17  9:43 AM.  Always use your most recent med list.                   Brand Name Dispense Instructions for use Diagnosis    acetaminophen 325 MG tablet    TYLENOL    100 tablet    Take 2 tablets (650 mg) by mouth every 6 hours as needed for mild pain or fever    Pregnancy, incidental       APTIOM 400 MG tablet   Generic drug:  eslicarbazepine acetate     30 tablet    Take 1 tablet (400 mg) by mouth At Bedtime        clobazam tablet    ONFI     Take 10 mg by mouth daily        KEPPRA PO      Take 1,000 mg by mouth        Multi-vitamin Tabs tablet      Take 1 tablet by mouth daily        NEW MED      USE 15 ML PO QID PRN        VIMPAT PO      Take 200 mg by mouth        VISTARIL PO           ZOLOFT 100 MG tablet   Generic drug:  sertraline      Take 100 mg by mouth daily

## 2017-10-10 NOTE — NURSING NOTE
Printed after visit summary given to pt.  Pt declined to set up colonoscopy at this time needs to check if out of network and  and .  Follow up appointment given.  Pt sent to the lab.

## 2017-10-10 NOTE — PATIENT INSTRUCTIONS
Good to meet you today!    We will get labs  Lab tests today at the 1st floor -- you will be notified of results by letter or my chart message in 7-10 days.  You will receive a phone call if more urgent follow up is needed.    We will get stool studies  You may  your stool sample containers at Lab 1st Floor  before you leave today.  You may drop off the stool samples at any [facility]    Schedule a colonoscopy with Dr. Sotelo under MAC  You will be called by the endoscopy   You will be mailed the instructions and prep  You will receive a call about one week prior by pre assessment nurse.     Schedule a MRI of your abdomen   [address]  Check in Room 1-327   You are scheduled on October 18   Check in time is 8  Nothing to eat or drink 6 hours prior  [phone] if you need to reschedule               When we have the results we will decide the best treatment plan    Follow up in 2 months or sooner if needed         For questions regarding your care Monday through Friday, contact the RN GI care coordinator,  Call   [phone] option 3 . Your call will be  returned same day, or if consultation is needed with the provider, it may be following business day - or you may send a My Chart message.    For medication refills (prescribed by the GI clinic), contact your pharmacy.    For appointment rescheduling/cancellation, contact [phone]     After hours, or if you have an immediate GI concern and cannot wait for a return call, contact the GI Fellow at [phone] and select option #4.

## 2017-10-10 NOTE — PROGRESS NOTES
GI CLINIC VISIT - NEW PATIENT    CC/REFERRING MD:   Referred Self    REASON FOR CONSULTATION:  History of proctitis    HPI:  27 year old female with history of epilepsy (medically refractory), depression/anxiety, migraines, medical cannabis use, eating disorder, intentional drug overdose is here to be seen in the GI clinic for evaluation of a history of proctitis.    She reports that in 2015 she had 3-4 months of rectal bleeding in the setting of constipation with irregular bowel habits. She was seen by JUDI of colorectal and appeared have proctitis on exam. She underwent a colonoscopy by Dr. Schaeffer of colorectal in 10/2017. This exam showed a normal ileum and mostly normal colon but evidence of inflammation in the rectum. Biopsies of the ileum, right and left colon were normal. Biopsies of the rectum showed active proctitis. She was initially treated with flagyl with incomplete response. Dr. Schaeffer then gave her pentasa orally and canasa suppositories. She reports these meds were expensive. Her abd pain improved but the bleeding did not change. After a month or so she stopped.    She then became pregnant with her son, Berny. During this time her GI symptoms completely resolved. Then starting about 1 month after Berny was born her symptoms began to return. She has had left sided and lower abdominal discomfort. +fecal urgency with tenesmus. Only 1 episode of rectal bleeding was 2 weeks ago but then resolved. She has 4 BMs per day. Often will just pass flatus. Stools irregular and sometimes she also feels constipated. She denies any EIM of IBD.    She has been taking NSAIDs (200 mg ibuprofen) daily for the last month or so for GANDARA.    Her seizure disorder has been poorly controlled and she is working with her neurology team to continue to manage this.    She also had an appendectomy when she was 14 weeks pregnant.    ROS:  10pt ROS performed and otherwise negative.    PERTINENT PAST MEDICAL HISTORY:  Past Medical  History:   Diagnosis Date     Allergic rhinitis      Anemia      Anxiety     triggers seizure     ASCUS on Pap smear 1/2014    age 23     ASCUS with positive high risk HPV cervical 09/14/16    ASCUS pap, + HR HPV (not 16 or 18). Pt is currently pregnant.      Asthma, intermittent      Depression      Insomnia     triggers seizure     Migraine      Papanicolaou smear of cervix with low grade squamous intraepithelial lesion (LGSIL) 9/24/08    BRIANNA I     Seizure disorder, partial dx age 6    Epilepsy, mult triggers.  [facility] every 6 mos         PREVIOUS ABDOMINAL/GYNECOLOGIC SURGERIES:  Past Surgical History:   Procedure Laterality Date     APPENDECTOMY  10/8/16     SURGICAL HISTORY OF -   2010    Brain Mapping.  No resection as affect Motor Skills     SURGICAL HISTORY OF -   2010    R Frontoparietal craniotomy for seizure mappingl          PREVIOUS ENDOSCOPY:  As above    PERTINENT MEDICATIONS:  Current Outpatient Prescriptions   Medication     multivitamin, therapeutic with minerals (MULTI-VITAMIN) TABS tablet     eslicarbazepine acetate (APTIOM) 400 MG tablet     NEW MED     acetaminophen (TYLENOL) 325 MG tablet     HydrOXYzine Pamoate (VISTARIL PO)     LevETIRAcetam (KEPPRA PO)     clobazam (ONFI) tablet     Lacosamide (VIMPAT PO)     sertraline (ZOLOFT) 100 MG tablet     No current facility-administered medications for this visit.      Ibuprofen 200 mg daily for the last month    Medications reviewed with patient today, see Medication List/Assessment for details.  No other NSAID/anticoagulation reported by patient.  No other OTC/herbal/supplements reported by patient.    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 Medical cannabis (vapes). No tobacco. Occ alcohol.     FAMILY HISTORY:  No colon/panc/esophageal/other GI CA, no other HNPCC-related Deven.  No IBD/celiac, no other AI/liver/thyroid disease.    PHYSICAL EXAMINATION:  Vitals reviewed, AFVSS  /83 (BP Location: Left arm, Patient  "Position: Chair, Cuff Size: Adult Regular)  Pulse 69  Temp 94.3  F (34.6  C)  Ht 1.727 m (5' 8\")  Wt 65 kg (143 lb 3.2 oz)  SpO2 98%  BMI 21.77 kg/m2  Gen: aaox3, cooperative, pleasant, not dyspneic/diaphoretic, nad  HEENT: ncat, neck supple, no clad/sclad, normal op w/o ulcer/exudate, anicteric, mmm  Resp/CV unremarkable  Abd:  Soft, mild tenderness in LLQ and lower abd. No distension, rebound or guarding  Ext: no c/c/e  Skin: warm, perfused, no jaundice  Neuro: grossly intact, no asterixis noted    PERTINENT STUDIES:  pending    Orders Only on 02/09/2017   Component Date Value Ref Range Status     Levetiracetam Level 02/09/2017 90.7*  Final     Topiramate Level 02/09/2017 3.7   Final     Lacosamide 02/09/2017 13.2*  Final     Desmethyllacosamide 02/09/2017 2.9   Final     Clobazam 02/09/2017 478*  Final     Desmethylclobazam 02/09/2017 1377   Final       ASSESSMENT/PLAN:  28 YO F with history of proctitis documented in 2015 here to discuss 5 months of urgency, gas, bloating, lower abdominal pain, tenesmus. The first order of business is to re-evaluate her mucosa with an ileo-colonoscopy. If proctitis still present we will also characterize with biopsies - I note that previous biopsies only showed active changes. If ulcerative proctitis is confirmed will maximize therapy with Canasa supp and possible steroid supp. Oral treatment could also be considered. Will ensure no progression of disease to more proximal bowel. Will also arrange MRE to ensure no evidence of Crohn's Disease. Labs today to check inflammatory markers and nutritional markers, etc. Stool studies to ensure no infection. We will regroup after the above results return to discuss on the appropriate management. If all studies are normal then her symptoms could be related to IBS or other functional GI disorder. Appropriate recommendations will be made at that time if that is the case. We discussed the plan and she agrees to this course of " action.    RTC 2-3 months, sooner if needed    Thank you for this consultation.  It was a pleasure to participate in the care of this patient; please contact us with any further questions.  A total of 60 minutes was spent with this patient, 50% of which was counseling regarding the above delineated issues.    Jose L Sotelo MD    [facility]   Department of Gastroenterology, Hepatology and Nutrition

## 2017-10-11 ENCOUNTER — TELEPHONE (OUTPATIENT)
Dept: GASTROENTEROLOGY | Facility: CLINIC | Age: 27
End: 2017-10-11

## 2017-10-11 LAB
G LAMBLIA AG STL QL IA: NORMAL
O+P STL MICRO: NORMAL
O+P STL MICRO: NORMAL
SPECIMEN SOURCE: NORMAL
SPECIMEN SOURCE: NORMAL

## 2017-10-12 LAB
TTG IGA SER-ACNC: 1 U/ML
TTG IGG SER-ACNC: <1 U/ML

## 2017-10-13 LAB — CALPROTECTIN STL-MCNT: <27 MG/KG (ref 0–49.9)

## 2017-10-14 ENCOUNTER — HEALTH MAINTENANCE LETTER (OUTPATIENT)
Age: 27
End: 2017-10-14

## 2017-10-18 ENCOUNTER — HOSPITAL ENCOUNTER (OUTPATIENT)
Dept: MRI IMAGING | Facility: CLINIC | Age: 27
Discharge: HOME | End: 2017-10-18
Attending: INTERNAL MEDICINE | Admitting: INTERNAL MEDICINE
Payer: COMMERCIAL

## 2017-10-18 DIAGNOSIS — K62.89 PROCTITIS: ICD-10-CM

## 2017-10-18 DIAGNOSIS — R19.8 IRREGULAR BOWEL HABITS: ICD-10-CM

## 2017-10-18 PROCEDURE — A9585 GADOBUTROL INJECTION: HCPCS | Performed by: INTERNAL MEDICINE

## 2017-10-18 PROCEDURE — 25000128 H RX IP 250 OP 636: Performed by: INTERNAL MEDICINE

## 2017-10-18 PROCEDURE — 72197 MRI PELVIS W/O & W/DYE: CPT

## 2017-10-18 RX ORDER — GADOBUTROL 604.72 MG/ML
7.5 INJECTION INTRAVENOUS ONCE
Status: COMPLETED | OUTPATIENT
Start: 2017-10-18 | End: 2017-10-18

## 2017-10-18 RX ADMIN — GADOBUTROL 6.5 ML: 604.72 INJECTION INTRAVENOUS at 09:35

## 2017-10-18 RX ADMIN — SODIUM CHLORIDE 50 ML: 9 INJECTION, SOLUTION INTRAVENOUS at 09:36

## 2017-10-18 RX ADMIN — GLUCAGON HYDROCHLORIDE 0.5 MG: 1 INJECTION, POWDER, FOR SOLUTION INTRAMUSCULAR; INTRAVENOUS; SUBCUTANEOUS at 09:31

## 2017-10-27 ENCOUNTER — CARE COORDINATION (OUTPATIENT)
Dept: GASTROENTEROLOGY | Facility: CLINIC | Age: 27
End: 2017-10-27

## 2017-11-01 ENCOUNTER — TELEPHONE (OUTPATIENT)
Dept: GASTROENTEROLOGY | Facility: OUTPATIENT CENTER | Age: 27
End: 2017-11-01

## 2017-11-01 DIAGNOSIS — A04.72 C. DIFFICILE COLITIS: Primary | ICD-10-CM

## 2017-11-01 NOTE — TELEPHONE ENCOUNTER
Patient taking any blood thinners ? no    Heart disease ? denies    Lung disease ? Asthma. No inhaler      Sleep apnea ? denies    Diabetic ? denies    Kidney disease ? denies    Dialysis ? n/a    Electronic implanted medical devices ? denies    Are you taking any narcotic pain medication ? no  What is your daily dosage ?    PTSD ? n/a    Prep instructions reviewed with patient ? Patient declined review, has had exam before.  policy, [facility] sedation plan reviewed. Patient states she is not pregnant or lactating    Pharmacy : [facility]    Indication for procedure :   C. difficile colitis [A04.72]            Referring provider :Jose L Sotelo MD

## 2017-11-08 ENCOUNTER — TRANSFERRED RECORDS (OUTPATIENT)
Dept: HEALTH INFORMATION MANAGEMENT | Facility: CLINIC | Age: 27
End: 2017-11-08

## 2017-11-08 ENCOUNTER — DOCUMENTATION ONLY (OUTPATIENT)
Dept: GASTROENTEROLOGY | Facility: OUTPATIENT CENTER | Age: 27
End: 2017-11-08

## 2017-11-10 LAB — COPATH REPORT: NORMAL

## 2017-11-18 ENCOUNTER — HEALTH MAINTENANCE LETTER (OUTPATIENT)
Age: 27
End: 2017-11-18

## 2018-10-20 ENCOUNTER — HEALTH MAINTENANCE LETTER (OUTPATIENT)
Age: 28
End: 2018-10-20

## 2019-05-21 ENCOUNTER — TELEPHONE (OUTPATIENT)
Dept: OBGYN | Facility: CLINIC | Age: 29
End: 2019-05-21

## 2019-05-21 DIAGNOSIS — R87.612 PAPANICOLAOU SMEAR OF CERVIX WITH LOW GRADE SQUAMOUS INTRAEPITHELIAL LESION (LGSIL): ICD-10-CM

## 2019-05-21 NOTE — TELEPHONE ENCOUNTER
Pt is past due for f/u pap smear.  [facility] clinic and schedule.    Ledy Clemens  Pap Tracking

## 2019-09-28 ENCOUNTER — HEALTH MAINTENANCE LETTER (OUTPATIENT)
Age: 29
End: 2019-09-28

## 2021-01-10 ENCOUNTER — HEALTH MAINTENANCE LETTER (OUTPATIENT)
Age: 31
End: 2021-01-10

## 2021-10-23 ENCOUNTER — HEALTH MAINTENANCE LETTER (OUTPATIENT)
Age: 31
End: 2021-10-23

## 2022-02-12 ENCOUNTER — HEALTH MAINTENANCE LETTER (OUTPATIENT)
Age: 32
End: 2022-02-12

## 2022-10-10 ENCOUNTER — HEALTH MAINTENANCE LETTER (OUTPATIENT)
Age: 32
End: 2022-10-10

## 2023-02-18 ENCOUNTER — HEALTH MAINTENANCE LETTER (OUTPATIENT)
Age: 33
End: 2023-02-18

## 2023-11-14 NOTE — IP AVS SNAPSHOT
MRN:[id number]                      After Visit Summary   2017    Gisele Jose    MRN: [id number]           Thank you!     Thank you for choosing [facility] for your care. Our goal is always to provide you with excellent care. Hearing back from our patients is one way we can continue to improve our services. Please take a few minutes to complete the written survey that you may receive in the mail after you visit with us. Thank you!        Patient Information     Date Of Birth          1990        About your hospital stay     You were admitted on:  2017 You last received care in the:   [facility]    You were discharged on:  2017        Reason for your hospital stay       Rule out  labor                  Who to Call     For medical emergencies, please call 911.  For non-urgent questions about your medical care, please call your primary care provider or clinic, [phone]          Attending Provider     Provider Specialty    Jordy Arias MD Obstetrics & Gynecology, Maternal & Fetal Medicine    Julisa Powell MD OB/Gyn    KeithsburgRozina MD OB/Gyn       Primary Care Provider Office Phone # Fax #    Marci Aguero HELEN Boothe [facility] [phone]       [place] [address]        After Care Instructions     Activity       Your activity upon discharge: activity as tolerated            Diet       Follow this diet upon discharge: Regular            Discharge Instructions       Please return for evaluation with onset of bright red bleeding, regular contractions, leakage of fluid, or decreased fetal movement                  Follow-up Appointments     Adult [facility] Follow-up and recommended labs and tests       Please plan a visit with your primary OBGYN within 1-2 weeks of discharge.  Plan to keep your appointment with your neurologist this friday                  Further instructions from your  care team       Discharge Instruction for Undelivered Patients      You were seen for: Labor Assessment  We Consulted: Dr. William  You had (Test or Medicine):monitoring, cervical check     Diet:   Drink 8 to 12 glasses of liquids (milk, juice, water) every day.  You may eat meals and snacks.     Activity:  Call your doctor or nurse midwife if your baby is moving less than usual.     Call your provider if you notice:  Swelling in your face or increased swelling in your hands or legs.  Headaches that are not relieved by Tylenol (acetaminophen).  Changes in your vision (blurring: seeing spots or stars.)  Nausea (sick to your stomach) and vomiting (throwing up).   Weight gain of 5 pounds or more per week.  Heartburn that doesn't go away.  Signs of bladder infection: pain when you urinate (use the toilet), need to go more often and more urgently.  The bag of benitez (rupture of membranes) breaks, or you notice leaking in your underwear.  Bright red blood in your underwear.  Abdominal (lower belly) or stomach pain.  For first baby: Contractions (tightening) less than 5 minutes apart for one hour or more.  Second (plus) baby: Contractions (tightening) less than 10 minutes apart and getting stronger.  *If less than 34 weeks: Contractions (tightenings) more than 6 times in one hour.  Increase or change in vaginal discharge (note the color and amount)      Follow-up:  As scheduled in the clinic          Pending Results     No orders found from 2/9/2017 to 2/12/2017.            Statement of Approval     Ordered          02/13/17 1054  I have reviewed and agree with all the recommendations and orders detailed in this document.  EFFECTIVE NOW     Approved and electronically signed by:  Rozina Brown MD             Admission Information     Date & Time Provider Department Dept. Phone    2/11/2017 Rozina Brown MD [facility] [phone]      Your Vitals Were     Blood Pressure Pulse Temperature Respirations Last Period        111/68 71 98  F (36.7  C) (Oral) 18 07/06/2016 (Exact Date)       [employer] Information     [employer] gives you secure access to your electronic health record. If you see a primary care provider, you can also send messages to your care team and make appointments. If you have questions, please call your primary care clinic.  If you do not have a primary care provider, please call [phone] and they will assist you.        Care EveryWhere ID     This is your Care EveryWhere ID. This could be used by other organizations to access your [facility] medical records  [id number]           Review of your medicines      CONTINUE these medicines which may have CHANGED, or have new prescriptions. If we are uncertain of the size of tablets/capsules you have at home, strength may be listed as something that might have changed.        Dose / Directions    acetaminophen 325 MG tablet   Commonly known as:  TYLENOL   This may have changed:    - medication strength  - how much to take  - when to take this  - Another medication with the same name was removed. Continue taking this medication, and follow the directions you see here.   Used for:  Pregnancy, incidental        Dose:  650 mg   Take 2 tablets (650 mg) by mouth every 6 hours as needed for mild pain or fever   Quantity:  100 tablet   Refills:  0         CONTINUE these medicines which have NOT CHANGED        Dose / Directions    clobazam tablet   Commonly known as:  ONFI        Dose:  10 mg   Take 10 mg by mouth daily   Refills:  0       KEPPRA PO        Dose:  1000 mg   Take 1,000 mg by mouth   Refills:  0       OXYCODONE HCL PO        Dose:  10 mg   Take 10 mg by mouth every 6 hours as needed   Refills:  0       prenatal multivitamin  plus iron 27-0.8 MG Tabs per tablet   Indication:  Pregnancy        Dose:  1 tablet   Take 1 tablet by mouth daily   Refills:  0       TOPAMAX PO        Dose:  25 mg   Take 25 mg by mouth   Refills:  0       VIMPAT PO        Dose:  200 mg   Take  200 mg by mouth   Refills:  0       VISTARIL PO        Refills:  0       ZOLOFT 100 MG tablet   Generic drug:  sertraline        Dose:  100 mg   Take 100 mg by mouth daily   Refills:  0            Where to get your medicines      Some of these will need a paper prescription and others can be bought over the counter. Ask your nurse if you have questions.     Bring a paper prescription for each of these medications     acetaminophen 325 MG tablet                Protect others around you: Learn how to safely use, store and throw away your medicines at www.disposemymeds.org.             Medication List: This is a list of all your medications and when to take them. Check marks below indicate your daily home schedule. Keep this list as a reference.      Medications           Morning Afternoon Evening Bedtime As Needed    acetaminophen 325 MG tablet   Commonly known as:  TYLENOL   Take 2 tablets (650 mg) by mouth every 6 hours as needed for mild pain or fever   Last time this was given:  650 mg on 2/12/2017  6:32 AM                                clobazam tablet   Commonly known as:  ONFI   Take 10 mg by mouth daily   Last time this was given:  10 mg on 2/13/2017  5:56 AM                                KEPPRA PO   Take 1,000 mg by mouth   Last time this was given:  2,000 mg on 2/13/2017  5:56 AM                                OXYCODONE HCL PO   Take 10 mg by mouth every 6 hours as needed                                prenatal multivitamin  plus iron 27-0.8 MG Tabs per tablet   Take 1 tablet by mouth daily   Last time this was given:  1 tablet on 2/13/2017  9:36 AM                                TOPAMAX PO   Take 25 mg by mouth   Last time this was given:  25 mg on 2/13/2017  5:56 AM                                VIMPAT PO   Take 200 mg by mouth   Last time this was given:  200 mg on 2/13/2017  5:56 AM                                VISTARIL PO                                ZOLOFT 100 MG tablet   Take 100 mg by mouth  daily   Last time this was given:  100 mg on 2/12/2017 11:57 AM   Generic drug:  sertraline                                   Helical Rim Text: The closure involved the helical rim.